# Patient Record
Sex: MALE | Race: WHITE | NOT HISPANIC OR LATINO | Employment: FULL TIME | URBAN - METROPOLITAN AREA
[De-identification: names, ages, dates, MRNs, and addresses within clinical notes are randomized per-mention and may not be internally consistent; named-entity substitution may affect disease eponyms.]

---

## 2017-02-02 ENCOUNTER — COMMUNICATION - HEALTHEAST (OUTPATIENT)
Dept: INTERNAL MEDICINE | Facility: CLINIC | Age: 47
End: 2017-02-02

## 2017-03-01 ENCOUNTER — COMMUNICATION - HEALTHEAST (OUTPATIENT)
Dept: INTERNAL MEDICINE | Facility: CLINIC | Age: 47
End: 2017-03-01

## 2017-03-20 ENCOUNTER — COMMUNICATION - HEALTHEAST (OUTPATIENT)
Dept: INTERNAL MEDICINE | Facility: CLINIC | Age: 47
End: 2017-03-20

## 2017-04-22 ENCOUNTER — COMMUNICATION - HEALTHEAST (OUTPATIENT)
Dept: INTERNAL MEDICINE | Facility: CLINIC | Age: 47
End: 2017-04-22

## 2017-05-20 ENCOUNTER — COMMUNICATION - HEALTHEAST (OUTPATIENT)
Dept: INTERNAL MEDICINE | Facility: CLINIC | Age: 47
End: 2017-05-20

## 2017-07-17 ENCOUNTER — COMMUNICATION - HEALTHEAST (OUTPATIENT)
Dept: INTERNAL MEDICINE | Facility: CLINIC | Age: 47
End: 2017-07-17

## 2017-08-16 ENCOUNTER — COMMUNICATION - HEALTHEAST (OUTPATIENT)
Dept: INTERNAL MEDICINE | Facility: CLINIC | Age: 47
End: 2017-08-16

## 2017-08-17 ENCOUNTER — RECORDS - HEALTHEAST (OUTPATIENT)
Dept: ADMINISTRATIVE | Facility: OTHER | Age: 47
End: 2017-08-17

## 2017-09-06 ENCOUNTER — COMMUNICATION - HEALTHEAST (OUTPATIENT)
Dept: INTERNAL MEDICINE | Facility: CLINIC | Age: 47
End: 2017-09-06

## 2017-10-09 ENCOUNTER — COMMUNICATION - HEALTHEAST (OUTPATIENT)
Dept: INTERNAL MEDICINE | Facility: CLINIC | Age: 47
End: 2017-10-09

## 2017-10-14 ENCOUNTER — COMMUNICATION - HEALTHEAST (OUTPATIENT)
Dept: INTERNAL MEDICINE | Facility: CLINIC | Age: 47
End: 2017-10-14

## 2017-11-09 ENCOUNTER — COMMUNICATION - HEALTHEAST (OUTPATIENT)
Dept: INTERNAL MEDICINE | Facility: CLINIC | Age: 47
End: 2017-11-09

## 2017-11-15 ENCOUNTER — COMMUNICATION - HEALTHEAST (OUTPATIENT)
Dept: INTERNAL MEDICINE | Facility: CLINIC | Age: 47
End: 2017-11-15

## 2017-12-08 ENCOUNTER — COMMUNICATION - HEALTHEAST (OUTPATIENT)
Dept: INTERNAL MEDICINE | Facility: CLINIC | Age: 47
End: 2017-12-08

## 2017-12-19 ENCOUNTER — COMMUNICATION - HEALTHEAST (OUTPATIENT)
Dept: INTERNAL MEDICINE | Facility: CLINIC | Age: 47
End: 2017-12-19

## 2017-12-27 ENCOUNTER — COMMUNICATION - HEALTHEAST (OUTPATIENT)
Dept: INTERNAL MEDICINE | Facility: CLINIC | Age: 47
End: 2017-12-27

## 2018-01-06 ENCOUNTER — COMMUNICATION - HEALTHEAST (OUTPATIENT)
Dept: INTERNAL MEDICINE | Facility: CLINIC | Age: 48
End: 2018-01-06

## 2018-01-19 ENCOUNTER — OFFICE VISIT - HEALTHEAST (OUTPATIENT)
Dept: INTERNAL MEDICINE | Facility: CLINIC | Age: 48
End: 2018-01-19

## 2018-01-19 DIAGNOSIS — I10 ESSENTIAL HYPERTENSION WITH GOAL BLOOD PRESSURE LESS THAN 140/90: ICD-10-CM

## 2018-01-19 DIAGNOSIS — Z79.899 MEDICATION MANAGEMENT: ICD-10-CM

## 2018-01-19 DIAGNOSIS — K21.9 GASTROESOPHAGEAL REFLUX DISEASE WITHOUT ESOPHAGITIS: ICD-10-CM

## 2018-01-19 LAB
ANION GAP SERPL CALCULATED.3IONS-SCNC: 12 MMOL/L (ref 5–18)
BASOPHILS # BLD AUTO: 0.1 THOU/UL (ref 0–0.2)
BASOPHILS NFR BLD AUTO: 1 % (ref 0–2)
BUN SERPL-MCNC: 7 MG/DL (ref 8–22)
CALCIUM SERPL-MCNC: 9.3 MG/DL (ref 8.5–10.5)
CHLORIDE BLD-SCNC: 97 MMOL/L (ref 98–107)
CO2 SERPL-SCNC: 30 MMOL/L (ref 22–31)
CREAT SERPL-MCNC: 0.75 MG/DL (ref 0.7–1.3)
EOSINOPHIL # BLD AUTO: 0.2 THOU/UL (ref 0–0.4)
EOSINOPHIL NFR BLD AUTO: 4 % (ref 0–6)
ERYTHROCYTE [DISTWIDTH] IN BLOOD BY AUTOMATED COUNT: 11.8 % (ref 11–14.5)
GFR SERPL CREATININE-BSD FRML MDRD: >60 ML/MIN/1.73M2
GLUCOSE BLD-MCNC: 107 MG/DL (ref 70–125)
HCT VFR BLD AUTO: 44.4 % (ref 40–54)
HGB BLD-MCNC: 15.8 G/DL (ref 14–18)
LYMPHOCYTES # BLD AUTO: 1.5 THOU/UL (ref 0.8–4.4)
LYMPHOCYTES NFR BLD AUTO: 25 % (ref 20–40)
MCH RBC QN AUTO: 38.1 PG (ref 27–34)
MCHC RBC AUTO-ENTMCNC: 35.6 G/DL (ref 32–36)
MCV RBC AUTO: 107 FL (ref 80–100)
MONOCYTES # BLD AUTO: 0.5 THOU/UL (ref 0–0.9)
MONOCYTES NFR BLD AUTO: 8 % (ref 2–10)
NEUTROPHILS # BLD AUTO: 3.7 THOU/UL (ref 2–7.7)
NEUTROPHILS NFR BLD AUTO: 63 % (ref 50–70)
PLATELET # BLD AUTO: 202 THOU/UL (ref 140–440)
PMV BLD AUTO: 10.7 FL (ref 8.5–12.5)
POTASSIUM BLD-SCNC: 3.3 MMOL/L (ref 3.5–5)
RBC # BLD AUTO: 4.15 MILL/UL (ref 4.4–6.2)
SODIUM SERPL-SCNC: 139 MMOL/L (ref 136–145)
WBC: 5.9 THOU/UL (ref 4–11)

## 2018-01-19 ASSESSMENT — MIFFLIN-ST. JEOR: SCORE: 1799.99

## 2018-01-22 ENCOUNTER — COMMUNICATION - HEALTHEAST (OUTPATIENT)
Dept: INTERNAL MEDICINE | Facility: CLINIC | Age: 48
End: 2018-01-22

## 2018-01-24 ENCOUNTER — COMMUNICATION - HEALTHEAST (OUTPATIENT)
Dept: INTERNAL MEDICINE | Facility: CLINIC | Age: 48
End: 2018-01-24

## 2018-01-24 DIAGNOSIS — I10 HYPERTENSION: ICD-10-CM

## 2018-03-01 ENCOUNTER — COMMUNICATION - HEALTHEAST (OUTPATIENT)
Dept: INTERNAL MEDICINE | Facility: CLINIC | Age: 48
End: 2018-03-01

## 2018-03-01 DIAGNOSIS — K21.9 GASTROESOPHAGEAL REFLUX DISEASE WITHOUT ESOPHAGITIS: ICD-10-CM

## 2018-04-03 ENCOUNTER — COMMUNICATION - HEALTHEAST (OUTPATIENT)
Dept: INTERNAL MEDICINE | Facility: CLINIC | Age: 48
End: 2018-04-03

## 2018-04-03 DIAGNOSIS — K21.9 GASTROESOPHAGEAL REFLUX DISEASE WITHOUT ESOPHAGITIS: ICD-10-CM

## 2018-05-01 ENCOUNTER — COMMUNICATION - HEALTHEAST (OUTPATIENT)
Dept: SCHEDULING | Facility: CLINIC | Age: 48
End: 2018-05-01

## 2018-05-01 ENCOUNTER — OFFICE VISIT - HEALTHEAST (OUTPATIENT)
Dept: INTERNAL MEDICINE | Facility: CLINIC | Age: 48
End: 2018-05-01

## 2018-05-01 DIAGNOSIS — L08.9 INFECTED WOUND: ICD-10-CM

## 2018-05-01 DIAGNOSIS — T14.8XXA INFECTED WOUND: ICD-10-CM

## 2018-05-01 ASSESSMENT — MIFFLIN-ST. JEOR: SCORE: 1796.77

## 2018-11-14 ENCOUNTER — RECORDS - HEALTHEAST (OUTPATIENT)
Dept: ADMINISTRATIVE | Facility: OTHER | Age: 48
End: 2018-11-14

## 2019-01-09 ENCOUNTER — COMMUNICATION - HEALTHEAST (OUTPATIENT)
Dept: INTERNAL MEDICINE | Facility: CLINIC | Age: 49
End: 2019-01-09

## 2019-01-09 DIAGNOSIS — I10 ESSENTIAL HYPERTENSION WITH GOAL BLOOD PRESSURE LESS THAN 140/90: ICD-10-CM

## 2019-02-08 ENCOUNTER — COMMUNICATION - HEALTHEAST (OUTPATIENT)
Dept: INTERNAL MEDICINE | Facility: CLINIC | Age: 49
End: 2019-02-08

## 2019-02-08 DIAGNOSIS — I10 HYPERTENSION: ICD-10-CM

## 2019-04-06 ENCOUNTER — COMMUNICATION - HEALTHEAST (OUTPATIENT)
Dept: INTERNAL MEDICINE | Facility: CLINIC | Age: 49
End: 2019-04-06

## 2019-04-06 DIAGNOSIS — I10 ESSENTIAL HYPERTENSION WITH GOAL BLOOD PRESSURE LESS THAN 140/90: ICD-10-CM

## 2019-04-08 ENCOUNTER — OFFICE VISIT - HEALTHEAST (OUTPATIENT)
Dept: INTERNAL MEDICINE | Facility: CLINIC | Age: 49
End: 2019-04-08

## 2019-04-08 DIAGNOSIS — I10 ESSENTIAL HYPERTENSION WITH GOAL BLOOD PRESSURE LESS THAN 140/90: ICD-10-CM

## 2019-04-08 LAB
ANION GAP SERPL CALCULATED.3IONS-SCNC: 14 MMOL/L (ref 5–18)
BUN SERPL-MCNC: 13 MG/DL (ref 8–22)
CALCIUM SERPL-MCNC: 10.1 MG/DL (ref 8.5–10.5)
CHLORIDE BLD-SCNC: 88 MMOL/L (ref 98–107)
CHOLEST SERPL-MCNC: 116 MG/DL
CO2 SERPL-SCNC: 25 MMOL/L (ref 22–31)
CREAT SERPL-MCNC: 1.2 MG/DL (ref 0.7–1.3)
FASTING STATUS PATIENT QL REPORTED: YES
GFR SERPL CREATININE-BSD FRML MDRD: >60 ML/MIN/1.73M2
GLUCOSE BLD-MCNC: 109 MG/DL (ref 70–125)
HDLC SERPL-MCNC: 46 MG/DL
POTASSIUM BLD-SCNC: 4 MMOL/L (ref 3.5–5)
SODIUM SERPL-SCNC: 127 MMOL/L (ref 136–145)

## 2019-04-08 ASSESSMENT — MIFFLIN-ST. JEOR: SCORE: 1769.37

## 2019-04-09 ENCOUNTER — COMMUNICATION - HEALTHEAST (OUTPATIENT)
Dept: INTERNAL MEDICINE | Facility: CLINIC | Age: 49
End: 2019-04-09

## 2019-04-09 ENCOUNTER — AMBULATORY - HEALTHEAST (OUTPATIENT)
Dept: INTERNAL MEDICINE | Facility: CLINIC | Age: 49
End: 2019-04-09

## 2019-04-09 DIAGNOSIS — I10 ESSENTIAL HYPERTENSION, BENIGN: ICD-10-CM

## 2019-05-03 ENCOUNTER — COMMUNICATION - HEALTHEAST (OUTPATIENT)
Dept: SCHEDULING | Facility: CLINIC | Age: 49
End: 2019-05-03

## 2019-05-03 ENCOUNTER — OFFICE VISIT - HEALTHEAST (OUTPATIENT)
Dept: INTERNAL MEDICINE | Facility: CLINIC | Age: 49
End: 2019-05-03

## 2019-05-03 DIAGNOSIS — I10 ESSENTIAL HYPERTENSION WITH GOAL BLOOD PRESSURE LESS THAN 140/90: ICD-10-CM

## 2019-05-03 DIAGNOSIS — R60.9 EDEMA, UNSPECIFIED TYPE: ICD-10-CM

## 2019-05-03 ASSESSMENT — MIFFLIN-ST. JEOR: SCORE: 1778.44

## 2019-05-04 ENCOUNTER — COMMUNICATION - HEALTHEAST (OUTPATIENT)
Dept: INTERNAL MEDICINE | Facility: CLINIC | Age: 49
End: 2019-05-04

## 2019-05-04 DIAGNOSIS — I10 HYPERTENSION: ICD-10-CM

## 2019-05-06 ENCOUNTER — COMMUNICATION - HEALTHEAST (OUTPATIENT)
Dept: INTERNAL MEDICINE | Facility: CLINIC | Age: 49
End: 2019-05-06

## 2019-05-06 ENCOUNTER — AMBULATORY - HEALTHEAST (OUTPATIENT)
Dept: INTERNAL MEDICINE | Facility: CLINIC | Age: 49
End: 2019-05-06

## 2019-05-06 DIAGNOSIS — R60.0 LOCALIZED EDEMA: ICD-10-CM

## 2019-06-26 ENCOUNTER — COMMUNICATION - HEALTHEAST (OUTPATIENT)
Dept: INTERNAL MEDICINE | Facility: CLINIC | Age: 49
End: 2019-06-26

## 2019-06-26 DIAGNOSIS — I10 ESSENTIAL HYPERTENSION WITH GOAL BLOOD PRESSURE LESS THAN 140/90: ICD-10-CM

## 2019-08-20 ENCOUNTER — COMMUNICATION - HEALTHEAST (OUTPATIENT)
Dept: INTERNAL MEDICINE | Facility: CLINIC | Age: 49
End: 2019-08-20

## 2019-08-20 DIAGNOSIS — I10 HYPERTENSION: ICD-10-CM

## 2019-09-08 ENCOUNTER — COMMUNICATION - HEALTHEAST (OUTPATIENT)
Dept: INTERNAL MEDICINE | Facility: CLINIC | Age: 49
End: 2019-09-08

## 2019-09-08 DIAGNOSIS — I10 ESSENTIAL HYPERTENSION WITH GOAL BLOOD PRESSURE LESS THAN 140/90: ICD-10-CM

## 2019-10-08 ENCOUNTER — AMBULATORY - HEALTHEAST (OUTPATIENT)
Dept: INTERNAL MEDICINE | Facility: CLINIC | Age: 49
End: 2019-10-08

## 2019-10-08 ENCOUNTER — COMMUNICATION - HEALTHEAST (OUTPATIENT)
Dept: INTERNAL MEDICINE | Facility: CLINIC | Age: 49
End: 2019-10-08

## 2019-10-08 DIAGNOSIS — N52.9 ERECTILE DYSFUNCTION, UNSPECIFIED ERECTILE DYSFUNCTION TYPE: ICD-10-CM

## 2019-12-04 ENCOUNTER — COMMUNICATION - HEALTHEAST (OUTPATIENT)
Dept: INTERNAL MEDICINE | Facility: CLINIC | Age: 49
End: 2019-12-04

## 2019-12-04 DIAGNOSIS — K21.9 GASTROESOPHAGEAL REFLUX DISEASE WITHOUT ESOPHAGITIS: ICD-10-CM

## 2020-01-20 ENCOUNTER — COMMUNICATION - HEALTHEAST (OUTPATIENT)
Dept: INTERNAL MEDICINE | Facility: CLINIC | Age: 50
End: 2020-01-20

## 2020-01-20 DIAGNOSIS — I10 ESSENTIAL HYPERTENSION WITH GOAL BLOOD PRESSURE LESS THAN 140/90: ICD-10-CM

## 2020-01-24 ENCOUNTER — COMMUNICATION - HEALTHEAST (OUTPATIENT)
Dept: INTERNAL MEDICINE | Facility: CLINIC | Age: 50
End: 2020-01-24

## 2020-01-24 DIAGNOSIS — N52.9 ERECTILE DYSFUNCTION, UNSPECIFIED ERECTILE DYSFUNCTION TYPE: ICD-10-CM

## 2020-04-22 ENCOUNTER — COMMUNICATION - HEALTHEAST (OUTPATIENT)
Dept: INTERNAL MEDICINE | Facility: CLINIC | Age: 50
End: 2020-04-22

## 2020-04-22 DIAGNOSIS — N52.9 ERECTILE DYSFUNCTION, UNSPECIFIED ERECTILE DYSFUNCTION TYPE: ICD-10-CM

## 2020-05-26 ENCOUNTER — COMMUNICATION - HEALTHEAST (OUTPATIENT)
Dept: INTERNAL MEDICINE | Facility: CLINIC | Age: 50
End: 2020-05-26

## 2020-05-26 DIAGNOSIS — K21.9 GASTROESOPHAGEAL REFLUX DISEASE WITHOUT ESOPHAGITIS: ICD-10-CM

## 2020-06-23 ENCOUNTER — COMMUNICATION - HEALTHEAST (OUTPATIENT)
Dept: INTERNAL MEDICINE | Facility: CLINIC | Age: 50
End: 2020-06-23

## 2020-06-23 DIAGNOSIS — K21.9 GASTROESOPHAGEAL REFLUX DISEASE WITHOUT ESOPHAGITIS: ICD-10-CM

## 2020-06-24 ENCOUNTER — COMMUNICATION - HEALTHEAST (OUTPATIENT)
Dept: INTERNAL MEDICINE | Facility: CLINIC | Age: 50
End: 2020-06-24

## 2020-06-24 DIAGNOSIS — N52.9 ERECTILE DYSFUNCTION, UNSPECIFIED ERECTILE DYSFUNCTION TYPE: ICD-10-CM

## 2020-07-16 ENCOUNTER — COMMUNICATION - HEALTHEAST (OUTPATIENT)
Dept: INTERNAL MEDICINE | Facility: CLINIC | Age: 50
End: 2020-07-16

## 2020-07-16 DIAGNOSIS — K21.9 GASTROESOPHAGEAL REFLUX DISEASE WITHOUT ESOPHAGITIS: ICD-10-CM

## 2020-07-17 ENCOUNTER — COMMUNICATION - HEALTHEAST (OUTPATIENT)
Dept: INTERNAL MEDICINE | Facility: CLINIC | Age: 50
End: 2020-07-17

## 2020-07-17 DIAGNOSIS — I10 ESSENTIAL HYPERTENSION WITH GOAL BLOOD PRESSURE LESS THAN 140/90: ICD-10-CM

## 2020-08-14 ENCOUNTER — COMMUNICATION - HEALTHEAST (OUTPATIENT)
Dept: INTERNAL MEDICINE | Facility: CLINIC | Age: 50
End: 2020-08-14

## 2020-08-14 DIAGNOSIS — I10 ESSENTIAL HYPERTENSION WITH GOAL BLOOD PRESSURE LESS THAN 140/90: ICD-10-CM

## 2020-10-06 ENCOUNTER — COMMUNICATION - HEALTHEAST (OUTPATIENT)
Dept: INTERNAL MEDICINE | Facility: CLINIC | Age: 50
End: 2020-10-06

## 2020-10-06 DIAGNOSIS — N52.9 ERECTILE DYSFUNCTION, UNSPECIFIED ERECTILE DYSFUNCTION TYPE: ICD-10-CM

## 2020-10-20 ENCOUNTER — COMMUNICATION - HEALTHEAST (OUTPATIENT)
Dept: INTERNAL MEDICINE | Facility: CLINIC | Age: 50
End: 2020-10-20

## 2020-10-20 DIAGNOSIS — I10 ESSENTIAL HYPERTENSION WITH GOAL BLOOD PRESSURE LESS THAN 140/90: ICD-10-CM

## 2020-11-10 ENCOUNTER — OFFICE VISIT - HEALTHEAST (OUTPATIENT)
Dept: INTERNAL MEDICINE | Facility: CLINIC | Age: 50
End: 2020-11-10

## 2020-11-10 DIAGNOSIS — Z11.59 ENCOUNTER FOR HEPATITIS C SCREENING TEST FOR LOW RISK PATIENT: ICD-10-CM

## 2020-11-10 DIAGNOSIS — N52.9 ERECTILE DYSFUNCTION, UNSPECIFIED ERECTILE DYSFUNCTION TYPE: ICD-10-CM

## 2020-11-10 DIAGNOSIS — E78.1 HYPERTRIGLYCERIDEMIA: ICD-10-CM

## 2020-11-10 DIAGNOSIS — I10 ESSENTIAL HYPERTENSION WITH GOAL BLOOD PRESSURE LESS THAN 140/90: ICD-10-CM

## 2020-11-10 DIAGNOSIS — K21.9 GASTROESOPHAGEAL REFLUX DISEASE WITHOUT ESOPHAGITIS: ICD-10-CM

## 2020-11-10 LAB
ALBUMIN SERPL-MCNC: 4.4 G/DL (ref 3.5–5)
ALP SERPL-CCNC: 126 U/L (ref 45–120)
ALT SERPL W P-5'-P-CCNC: 39 U/L (ref 0–45)
ANION GAP SERPL CALCULATED.3IONS-SCNC: 11 MMOL/L (ref 5–18)
AST SERPL W P-5'-P-CCNC: 57 U/L (ref 0–40)
BILIRUB SERPL-MCNC: 1.5 MG/DL (ref 0–1)
BUN SERPL-MCNC: 13 MG/DL (ref 8–22)
CALCIUM SERPL-MCNC: 9.9 MG/DL (ref 8.5–10.5)
CHLORIDE BLD-SCNC: 96 MMOL/L (ref 98–107)
CHOLEST SERPL-MCNC: 143 MG/DL
CO2 SERPL-SCNC: 29 MMOL/L (ref 22–31)
CREAT SERPL-MCNC: 1.21 MG/DL (ref 0.7–1.3)
ERYTHROCYTE [DISTWIDTH] IN BLOOD BY AUTOMATED COUNT: 10 % (ref 11–14.5)
FASTING STATUS PATIENT QL REPORTED: NO
GFR SERPL CREATININE-BSD FRML MDRD: >60 ML/MIN/1.73M2
GLUCOSE BLD-MCNC: 104 MG/DL (ref 70–125)
HCT VFR BLD AUTO: 49.6 % (ref 40–54)
HDLC SERPL-MCNC: 54 MG/DL
HGB BLD-MCNC: 17.2 G/DL (ref 14–18)
LDLC SERPL CALC-MCNC: 56 MG/DL
MCH RBC QN AUTO: 40 PG (ref 27–34)
MCHC RBC AUTO-ENTMCNC: 34.7 G/DL (ref 32–36)
MCV RBC AUTO: 115 FL (ref 80–100)
PLATELET # BLD AUTO: 119 THOU/UL (ref 140–440)
PMV BLD AUTO: 7.6 FL (ref 7–10)
POTASSIUM BLD-SCNC: 4.1 MMOL/L (ref 3.5–5)
PROT SERPL-MCNC: 7.3 G/DL (ref 6–8)
RBC # BLD AUTO: 4.3 MILL/UL (ref 4.4–6.2)
SODIUM SERPL-SCNC: 136 MMOL/L (ref 136–145)
TRIGL SERPL-MCNC: 164 MG/DL
WBC: 6.2 THOU/UL (ref 4–11)

## 2020-11-10 RX ORDER — METOPROLOL SUCCINATE 50 MG/1
TABLET, EXTENDED RELEASE ORAL
Qty: 50 TABLET | Refills: 3 | Status: SHIPPED | OUTPATIENT
Start: 2020-11-10 | End: 2021-09-30

## 2020-11-10 RX ORDER — LISINOPRIL AND HYDROCHLOROTHIAZIDE 20; 25 MG/1; MG/1
TABLET ORAL
Qty: 90 TABLET | Refills: 3 | Status: SHIPPED | OUTPATIENT
Start: 2020-11-10 | End: 2021-11-10

## 2020-11-10 RX ORDER — SILDENAFIL 50 MG/1
50 TABLET, FILM COATED ORAL PRN
Qty: 12 TABLET | Refills: 3 | Status: SHIPPED | OUTPATIENT
Start: 2020-11-10 | End: 2021-09-30

## 2020-11-10 ASSESSMENT — MIFFLIN-ST. JEOR: SCORE: 1728.55

## 2020-11-11 ENCOUNTER — COMMUNICATION - HEALTHEAST (OUTPATIENT)
Dept: INTERNAL MEDICINE | Facility: CLINIC | Age: 50
End: 2020-11-11

## 2020-11-11 LAB — HCV AB SERPL QL IA: NEGATIVE

## 2021-05-25 ENCOUNTER — RECORDS - HEALTHEAST (OUTPATIENT)
Dept: ADMINISTRATIVE | Facility: CLINIC | Age: 51
End: 2021-05-25

## 2021-05-27 NOTE — TELEPHONE ENCOUNTER
RN cannot approve Refill Request    RN can NOT refill this medication PCP messaged that patient is overdue for Labs.       Meron Isaac, [facility] Triage/Med Refill 4/8/2019    Requested Prescriptions   Pending Prescriptions Disp Refills     lisinopril-hydrochlorothiazide (PRINZIDE,ZESTORETIC) 20-25 mg per tablet [Pharmacy Med Name: LISINOPRIL-HCTZ 20-25 MG TAB] 90 tablet 0     Sig: TAKE ONE TABLET BY MOUTH ONE TIME DAILY       Diuretics/Combination Diuretics Refill Protocol  Failed - 4/6/2019 12:16 AM        Failed - Serum Potassium in past 12 months      No results found for: LN-POTASSIUM          Failed - Serum Sodium in past 12 months      No results found for: LN-SODIUM          Failed - Serum Creatinine in past 12 months      Creatinine   Date Value Ref Range Status   03/27/2018 0.76 0.70 - 1.30 mg/dL Final             Passed - Visit with PCP or prescribing provider visit in past 12 months     Last office visit with prescriber/PCP: 1/19/2018 Carlos Chow MD OR same dept: 5/1/2018 Raj Shen MD OR same specialty: 5/1/2018 Raj Shen MD  Last physical: 9/21/2016 Last MTM visit: Visit date not found   Next visit within 3 mo: Visit date not found  Next physical within 3 mo: Visit date not found  Prescriber OR PCP: Carlos Chow MD  Last diagnosis associated with med order: 1. Essential hypertension with goal blood pressure less than 140/90  - lisinopril-hydrochlorothiazide (PRINZIDE,ZESTORETIC) 20-25 mg per tablet [Pharmacy Med Name: LISINOPRIL-HCTZ 20-25 MG TAB]; TAKE ONE TABLET BY MOUTH ONE TIME DAILY  Dispense: 90 tablet; Refill: 0    If protocol passes may refill for 12 months if within 3 months of last provider visit (or a total of 15 months).             Passed - Blood pressure on file in past 12 months     BP Readings from Last 1 Encounters:   05/01/18 126/84

## 2021-05-27 NOTE — PROGRESS NOTES
"OFFICE VISIT NOTE    Subjective:   Chief Complaint:  Follow-up (med check)    49-year-old man in for follow-up regarding hypertension.  He is doing well.  He works full-time.  He is fairly active.  No cardiopulmonary symptoms.  He is having some minor problems with erectile dysfunction.    Current Outpatient Medications   Medication Sig     lisinopril-hydrochlorothiazide (PRINZIDE,ZESTORETIC) 20-25 mg per tablet TAKE ONE TABLET BY MOUTH ONE TIME DAILY     metoprolol succinate (TOPROL-XL) 50 MG 24 hr tablet Take 1 tablet (50 mg total) by mouth daily.     omeprazole (PRILOSEC) 20 MG capsule TAKE ONE CAPSULE BY MOUTH ONE TIME DAILY       PSFHx: Tobacco Status:  He  reports that he has never smoked. He has never used smokeless tobacco.    Review of Systems:  A comprehensive review of systems is negative except for the comments above    Objective:    Ht 5' 10\" (1.778 m)   Wt 198 lb (89.8 kg)   BMI 28.41 kg/m    GENERAL: No acute distress.  Pressure 112/70 pulse 70 and regular.  Eyes are normal without any hemorrhages or exudates.  Without bruits.  Lungs are clear.  Pedal pulses are excellent.  Heart shows a sinus rhythm without murmur gallop or rub.  No extra ectopic beats.  The abdomen is soft without masses or any organomegaly  No flank tenderness.    Assessment & Plan   Richard J Bloch Jr. is a 49 y.o. male.    Hypertension which is under very good control.  He is having some erectile dysfunction.  We will reduce the metoprolol to 25 mg daily.  Also reduce the lisinopril hydrochlorothiazide to 10-12.5 mg 1 tablet daily.  He should check blood pressures daily.  If he continues to have problems with erectile dysfunction, we will either discontinue metoprolol completely or add a medication such as Viagra.    Diagnoses and all orders for this visit:    Essential hypertension with goal blood pressure less than 140/90    Erectile dysfunction    The following high BMI interventions were performed this visit: encouragement " to exercise    Carlos Chow MD  Transcription using voice recognition software, may contain typographical errors.

## 2021-05-28 NOTE — TELEPHONE ENCOUNTER
Dr. Chow,   Please advise how you would like to proceed.  Would you like the patient to schedule an appointment?  Martina WEST, SHAMAR/NINO....................3:43 PM

## 2021-05-28 NOTE — TELEPHONE ENCOUNTER
RN cannot approve Refill Request    RN can NOT refill this medication SIG Clarification Needed, dose discrepancy .     Last office visit: 4/8/2019 Carlos Chow MD Last Physical: 9/21/2016 Last MTM visit: Visit date not found Last visit same specialty: 5/3/2019 Justin Rosales MD.  Next visit within 3 mo: Visit date not found  Next physical within 3 mo: Visit date not found      Queenie Owens, [facility] Triage/Med Refill 5/5/2019    Requested Prescriptions   Pending Prescriptions Disp Refills     metoprolol succinate (TOPROL-XL) 50 MG 24 hr tablet [Pharmacy Med Name: METOPROLOL SUCC ER 50 MG TAB] 90 tablet 0     Sig: TAKE 1 TABLET BY MOUTH EVERY DAY       Beta-Blockers Refill Protocol Passed - 5/4/2019 12:18 AM        Passed - PCP or prescribing provider visit in past 12 months or next 3 months     Last office visit with prescriber/PCP: 4/8/2019 Carlos Chow MD OR same dept: 5/3/2019 Justin Rosales MD OR same specialty: 5/3/2019 Justin Rosales MD  Last physical: 9/21/2016 Last MTM visit: Visit date not found   Next visit within 3 mo: Visit date not found  Next physical within 3 mo: Visit date not found  Prescriber OR PCP: Carlos Chow MD  Last diagnosis associated with med order: 1. Hypertension  - metoprolol succinate (TOPROL-XL) 50 MG 24 hr tablet [Pharmacy Med Name: METOPROLOL SUCC ER 50 MG TAB]; TAKE 1 TABLET BY MOUTH EVERY DAY  Dispense: 90 tablet; Refill: 0    If protocol passes may refill for 12 months if within 3 months of last provider visit (or a total of 15 months).             Passed - Blood pressure filed in past 12 months     BP Readings from Last 1 Encounters:   05/03/19 122/80

## 2021-05-28 NOTE — TELEPHONE ENCOUNTER
"BP meds were cut in half 2-3 weeks ago.    Has never been on a water pill or lasix before.    Legs hurt.  \"Fat', and work shoes also causing them to hurt.    Patient looking to be seen today DTN.    Transferred to scheduling for an appointment.    Dariela Escudero, RN, [facility] Nurse Triage/Med Refills RN     Reason for Disposition    MODERATE swelling of both ankles (e.g., swelling extends up to the knees) AND new onset or worsening    Protocols used: LEG SWELLING AND EDEMA-A-OH      "

## 2021-05-28 NOTE — TELEPHONE ENCOUNTER
Medication Question or Clarification  Who is calling: Patient  What medication are you calling about? (include dose and sig) Metoprolol, and HCTZ   Patient is still having the swelling in in his feet and it is worse than when he came in and saw Dr. Rosales   Who prescribed the medication?:  Claudine  What is your question/concern?:  Patient is having problems with feet swelling  Pharmacy:  [facility]  Okay to leave a detailed message?: Yes  Site CMT - Please call the pharmacy to obtain any additional needed information.

## 2021-05-28 NOTE — PROGRESS NOTES
[facility] Follow Up Note    Richard J Bloch .   49 y.o. male    Date of Visit: 5/3/2019    Chief Complaint   Patient presents with     Foot Swelling     BP meds got lowered, foot swelled after. Both feet.      Subjective  This is a 49-year-old man who is a patient of Dr. Manzanares.  He has a long history of hypertension.  He saw Dr. Chow about 1 month ago and I have had an opportunity to review those notes.  At that time his blood pressure medications were reduced.  His metoprolol dose was cut in half as was his dose of lisinopril-HCTZ.  Since that time he has developed increasing edema in his feet.  There does not seem to be any extension of the edema into the ankles or legs.  Again, the swelling is bilateral.  He denies any changes in his diet.  He is not taking any new over-the-counter medications.  He has not changed his activity level and is not spending any more time standing on his feet than he has in the past.  He denies any shortness of breath.  He has no other new symptoms to report.  The swelling is not necessarily getting progressively worse but it is not improving.    ROS A comprehensive review of systems was performed and was otherwise negative    Medications, allergies, and problem list were reviewed and updated    Exam  General Appearance:   On examination his blood pressure is good at 122/80.  Weight is 200 pounds which is up a couple of pounds since his last visit.  Height is 70 inches and BMI is 28.70.    Lungs are clear.    Heart is in a sinus rhythm with a rate of 84 and no ectopy.    He does have 1+ edema in both feet.  None in the ankles.    The patient is alert and oriented x3.      Assessment/Plan  1. Essential hypertension with goal blood pressure less than 140/90  lisinopril (PRINIVIL,ZESTRIL) 10 MG tablet    hydroCHLOROthiazide (HYDRODIURIL) 25 MG tablet   2. Edema, unspecified type       Hypertension.  Stable.    Increased edema in his feet.  In reviewing the  situation my only explanation is the decrease in his dose of hydrochlorothiazide last month.  I do not think that the change in his lisinopril or metoprolol doses is having any effect on this.  I discussed this with him and suggested we split his combination medication to continue the lisinopril 10 mg daily but to increase his HCTZ back to 25 mg daily as he has been on.  No change in the metoprolol.  I asked him to give this about 10 days to see how it works out and then follow-up with Dr. Chow.    Total time of this office visit was 25 minutes with greater than 50% of the time spent in care coordination of patient counseling.  Body Mass Index was not assessed due to Patient was in with an acute medical issue..    Justin Rosales MD      Current Outpatient Medications on File Prior to Visit   Medication Sig     lisinopril-hydrochlorothiazide (PRINZIDE,ZESTORETIC) 20-25 mg per tablet TAKE ONE TABLET BY MOUTH ONE TIME DAILY     lisinopril-hydrochlorothiazide (ZESTORETIC) 10-12.5 mg per tablet Take 1 tablet by mouth daily.     metoprolol succinate (TOPROL-XL) 50 MG 24 hr tablet Half tab p.o. daily     omeprazole (PRILOSEC) 20 MG capsule TAKE ONE CAPSULE BY MOUTH ONE TIME DAILY     [DISCONTINUED] lisinopril (PRINIVIL,ZESTRIL) 10 MG tablet Take 1 tablet (10 mg total) by mouth daily.     No current facility-administered medications on file prior to visit.      Allergies   Allergen Reactions     Amlodipine Swelling     Leg edema     Social History     Tobacco Use     Smoking status: Never Smoker     Smokeless tobacco: Never Used   Substance Use Topics     Alcohol use: Not on file     Drug use: Not on file

## 2021-05-28 NOTE — TELEPHONE ENCOUNTER
I  am okay with him trying the full dosage of the previous blood pressure medication.  If he has side effects or , edema persists, then he should try the once a week furosemide.

## 2021-05-28 NOTE — TELEPHONE ENCOUNTER
He should be restricting his sodium intake to 2 to 4 g of sodium per day.  On Wednesday of each week, he should take furosemide 20 mg.  That should help diurese him and get better with the extra fluid.  We may need to increase that dosage to 2 days/week but he should contact me first with a progress report.  I will send in the prescription for furosemide

## 2021-05-28 NOTE — TELEPHONE ENCOUNTER
Spoke with the patient and relayed message below from Dr. Chow.  Patient verbalized understanding and had no further questions at this time.  Martina WEST, SHAMAR/NINO....................5:08 PM

## 2021-05-28 NOTE — TELEPHONE ENCOUNTER
Patient states he was fine until you cut his BP med in half. I did give him the below instructions, but he is wondering your thoughts on going back to the BP full dose. Thank you.    Rafaela Malone, CMA

## 2021-05-30 NOTE — TELEPHONE ENCOUNTER
Refill Approved    Rx renewed per Medication Renewal Policy. Medication was last renewed on 4/8/19.    Meron Isaac, [facility] Triage/Med Refill 6/27/2019     Requested Prescriptions   Pending Prescriptions Disp Refills     lisinopril-hydrochlorothiazide (PRINZIDE,ZESTORETIC) 20-25 mg per tablet [Pharmacy Med Name: LISINOPRIL-HCTZ 20-25 MG TAB] 30 tablet 2     Sig: TAKE ONE TABLET BY MOUTH ONE TIME DAILY       Diuretics/Combination Diuretics Refill Protocol  Passed - 6/26/2019  5:22 PM        Passed - Visit with PCP or prescribing provider visit in past 12 months     Last office visit with prescriber/PCP: 4/8/2019 Carlos Chow MD OR same dept: 5/3/2019 Justin Rosales MD OR same specialty: 5/3/2019 Justin Rosales MD  Last physical: 9/21/2016 Last MTM visit: Visit date not found   Next visit within 3 mo: Visit date not found  Next physical within 3 mo: Visit date not found  Prescriber OR PCP: Carlos Chow MD  Last diagnosis associated with med order: 1. Essential hypertension with goal blood pressure less than 140/90  - lisinopril-hydrochlorothiazide (PRINZIDE,ZESTORETIC) 20-25 mg per tablet [Pharmacy Med Name: LISINOPRIL-HCTZ 20-25 MG TAB]; TAKE ONE TABLET BY MOUTH ONE TIME DAILY  Dispense: 30 tablet; Refill: 2    If protocol passes may refill for 12 months if within 3 months of last provider visit (or a total of 15 months).             Passed - Serum Potassium in past 12 months      Lab Results   Component Value Date    Potassium 4.0 04/08/2019             Passed - Serum Sodium in past 12 months      Lab Results   Component Value Date    Sodium 127 (L) 04/08/2019             Passed - Blood pressure on file in past 12 months     BP Readings from Last 1 Encounters:   05/03/19 122/80             Passed - Serum Creatinine in past 12 months      Creatinine   Date Value Ref Range Status   04/08/2019 1.20 0.70 - 1.30 mg/dL Final

## 2021-05-31 ENCOUNTER — RECORDS - HEALTHEAST (OUTPATIENT)
Dept: ADMINISTRATIVE | Facility: CLINIC | Age: 51
End: 2021-05-31

## 2021-05-31 VITALS — BODY MASS INDEX: 29.31 KG/M2 | WEIGHT: 204.75 LBS | HEIGHT: 70 IN

## 2021-05-31 NOTE — TELEPHONE ENCOUNTER
RN cannot approve Refill Request    RN can NOT refill this medication Protocol failed and NO refill given. Last office visit: 4/8/2019 Carlos Chow MD Last Physical: 9/21/2016 Last MTM visit: Visit date not found Last visit same specialty: 5/3/2019 Justin Rosales MD.  Next visit within 3 mo: Visit date not found  Next physical within 3 mo: Visit date not found      Skye Falk, [facility] Triage/Med Refill 8/20/2019    Requested Prescriptions   Pending Prescriptions Disp Refills     metoprolol succinate (TOPROL-XL) 50 MG 24 hr tablet [Pharmacy Med Name: METOPROLOL SUCC ER 50 MG TAB] 90 tablet 2     Sig: Take 1 tablet (50 mg total) by mouth daily.       Beta-Blockers Refill Protocol Passed - 8/20/2019  1:10 AM        Passed - PCP or prescribing provider visit in past 12 months or next 3 months     Last office visit with prescriber/PCP: 4/8/2019 Carlos Chow MD OR same dept: 5/3/2019 Justin Rosales MD OR same specialty: 5/3/2019 Justin Rosales MD  Last physical: 9/21/2016 Last MTM visit: Visit date not found   Next visit within 3 mo: Visit date not found  Next physical within 3 mo: Visit date not found  Prescriber OR PCP: Carlos Chow MD  Last diagnosis associated with med order: 1. Hypertension  - metoprolol succinate (TOPROL-XL) 50 MG 24 hr tablet [Pharmacy Med Name: METOPROLOL SUCC ER 50 MG TAB]; TAKE 1 TABLET BY MOUTH EVERY DAY  Dispense: 30 tablet; Refill: 2    If protocol passes may refill for 12 months if within 3 months of last provider visit (or a total of 15 months).             Passed - Blood pressure filed in past 12 months     BP Readings from Last 1 Encounters:   05/03/19 122/80

## 2021-06-01 VITALS — BODY MASS INDEX: 29.21 KG/M2 | WEIGHT: 204.04 LBS | HEIGHT: 70 IN

## 2021-06-01 NOTE — TELEPHONE ENCOUNTER
Refill Approved    Rx renewed per Medication Renewal Policy. Medication was last renewed on 5/3/19.    Tiffany Goodman, [facility] Triage/Med Refill 9/8/2019     Requested Prescriptions   Pending Prescriptions Disp Refills     hydroCHLOROthiazide (HYDRODIURIL) 25 MG tablet [Pharmacy Med Name: HYDROCHLOROTHIAZIDE 25 MG TAB] 30 tablet 11     Sig: TAKE 1 TABLET BY MOUTH EVERY DAY       Diuretics/Combination Diuretics Refill Protocol  Passed - 9/8/2019 11:23 AM        Passed - Visit with PCP or prescribing provider visit in past 12 months     Last office visit with prescriber/PCP: 5/3/2019 Justin Rosales MD OR same dept: 5/3/2019 Justin Rosales MD OR same specialty: 5/3/2019 Justin Rosales MD  Last physical: Visit date not found Last MTM visit: Visit date not found   Next visit within 3 mo: Visit date not found  Next physical within 3 mo: Visit date not found  Prescriber OR PCP: Justin Rosales MD  Last diagnosis associated with med order: 1. Essential hypertension with goal blood pressure less than 140/90  - hydroCHLOROthiazide (HYDRODIURIL) 25 MG tablet [Pharmacy Med Name: HYDROCHLOROTHIAZIDE 25 MG TAB]; TAKE 1 TABLET BY MOUTH EVERY DAY  Dispense: 30 tablet; Refill: 11    If protocol passes may refill for 12 months if within 3 months of last provider visit (or a total of 15 months).             Passed - Serum Potassium in past 12 months      Lab Results   Component Value Date    Potassium 4.0 04/08/2019             Passed - Serum Sodium in past 12 months      Lab Results   Component Value Date    Sodium 127 (L) 04/08/2019             Passed - Blood pressure on file in past 12 months     BP Readings from Last 1 Encounters:   05/03/19 122/80             Passed - Serum Creatinine in past 12 months      Creatinine   Date Value Ref Range Status   04/08/2019 1.20 0.70 - 1.30 mg/dL Final

## 2021-06-02 VITALS — HEIGHT: 70 IN | WEIGHT: 198 LBS | BODY MASS INDEX: 28.35 KG/M2

## 2021-06-02 NOTE — TELEPHONE ENCOUNTER
Medication Request  Medication name: Viagra  Pharmacy Name and Location: [facility]  Reason for request: Erectile dysfunction  When did you use medication last?:  n/a  Patient offered appointment:  patient declined  Okay to leave a detailed message: yes

## 2021-06-03 VITALS — HEIGHT: 70 IN | WEIGHT: 200 LBS | BODY MASS INDEX: 28.63 KG/M2

## 2021-06-04 NOTE — TELEPHONE ENCOUNTER
Future Appointments:  No future appointments. Last Appointment With Me:  11/6/2020     Requested Prescriptions     Pending Prescriptions Disp Refills    traMADoL (ULTRAM) 50 mg tablet 120 Tab 0     Sig: Take 1 Tab by mouth every six (6) hours as needed for Pain for up to 30 days. Max Daily Amount: 200 mg. Refill Request  Did you contact pharmacy: No  Medication name:   Requested Prescriptions     Pending Prescriptions Disp Refills     omeprazole (PRILOSEC) 20 MG capsule 30 capsule 8     Sig: Take 1 capsule (20 mg total) by mouth daily.     Who prescribed the medication: Carlos Chow MD   [facility] [place]   Is patient out of medication: n/a  Patient notified refills processed in 72 hours:  yes  Okay to leave a detailed message: yes

## 2021-06-04 NOTE — TELEPHONE ENCOUNTER
Refill Approved    Rx renewed per Medication Renewal Policy. Medication was last renewed on 4/4/18.    Meron Isaac, [facility] Triage/Med Refill 12/6/2019     Requested Prescriptions   Pending Prescriptions Disp Refills     omeprazole (PRILOSEC) 20 MG capsule 30 capsule 8     Sig: Take 1 capsule (20 mg total) by mouth daily.       GI Medications Refill Protocol Passed - 12/4/2019  2:42 PM        Passed - PCP or prescribing provider visit in last 12 or next 3 months.     Last office visit with prescriber/PCP: 4/8/2019 Carlos Chow MD OR same dept: 5/3/2019 Justin Rosales MD OR same specialty: 5/3/2019 Justin Rosales MD  Last physical: 9/21/2016 Last MTM visit: Visit date not found   Next visit within 3 mo: Visit date not found  Next physical within 3 mo: Visit date not found  Prescriber OR PCP: Carlos Chow MD  Last diagnosis associated with med order: 1. Gastroesophageal reflux disease without esophagitis  - omeprazole (PRILOSEC) 20 MG capsule; Take 1 capsule (20 mg total) by mouth daily.  Dispense: 30 capsule; Refill: 8    If protocol passes may refill for 12 months if within 3 months of last provider visit (or a total of 15 months).

## 2021-06-05 VITALS
HEIGHT: 70 IN | WEIGHT: 189 LBS | SYSTOLIC BLOOD PRESSURE: 126 MMHG | OXYGEN SATURATION: 98 % | HEART RATE: 73 BPM | DIASTOLIC BLOOD PRESSURE: 78 MMHG | BODY MASS INDEX: 27.06 KG/M2

## 2021-06-05 NOTE — TELEPHONE ENCOUNTER
Refill Approved    Rx renewed per Medication Renewal Policy. Medication was last renewed on 10/8/19 .    Tiffany Goodman, [facility] Connection Triage/Med Refill 1/24/2020     Requested Prescriptions   Pending Prescriptions Disp Refills     sildenafil (VIAGRA) 50 MG tablet [Pharmacy Med Name: SILDENAFIL 50 MG TABLET] 6 tablet 3     Sig: TAKE 1 TABLET BY MOUTH AS NEEDED FOR ERECTILE DYSFUNCTION       Medications for Impotence Refill Protocol Passed - 1/24/2020  1:41 AM        Passed - PCP or prescribing provider visit in last year     Last office visit with prescriber/PCP: 4/8/2019 Carlos Chow MD OR same dept: 5/3/2019 Justin Rosales MD OR same specialty: 5/3/2019 Justin Rosales MD  Last physical: 9/21/2016 Last MTM visit: Visit date not found   Next visit within 3 mo: Visit date not found  Next physical within 3 mo: Visit date not found  Prescriber OR PCP: Carlos Chow MD  Last diagnosis associated with med order: 1. Erectile dysfunction, unspecified erectile dysfunction type  - sildenafil (VIAGRA) 50 MG tablet [Pharmacy Med Name: SILDENAFIL 50 MG TABLET]; TAKE 1 TABLET BY MOUTH AS NEEDED FOR ERECTILE DYSFUNCTION  Dispense: 6 tablet; Refill: 3    If protocol passes may refill for 12 months if within 3 months of last provider visit (or a total of 15 months).

## 2021-06-05 NOTE — TELEPHONE ENCOUNTER
Refill Approved    Rx renewed per Medication Renewal Policy. Medication was last renewed on 6/27/2019 for 90/2  Last OV 5/3/2019    Keily Alcocer, [facility] Triage/Med Refill 1/21/2020     Requested Prescriptions   Pending Prescriptions Disp Refills     lisinopril-hydrochlorothiazide (PRINZIDE,ZESTORETIC) 20-25 mg per tablet [Pharmacy Med Name: LISINOPRIL-HCTZ 20-25 MG TAB] 30 tablet 8     Sig: TAKE 1 TABLET BY MOUTH EVERY DAY       Diuretics/Combination Diuretics Refill Protocol  Passed - 1/20/2020 10:35 AM        Passed - Visit with PCP or prescribing provider visit in past 12 months     Last office visit with prescriber/PCP: 4/8/2019 Carlos Chow MD OR same dept: 5/3/2019 Justin Rosales MD OR same specialty: 5/3/2019 Justin Rosales MD  Last physical: 9/21/2016 Last MTM visit: Visit date not found   Next visit within 3 mo: Visit date not found  Next physical within 3 mo: Visit date not found  Prescriber OR PCP: Carlos Chow MD  Last diagnosis associated with med order: 1. Essential hypertension with goal blood pressure less than 140/90  - lisinopril-hydrochlorothiazide (PRINZIDE,ZESTORETIC) 20-25 mg per tablet [Pharmacy Med Name: LISINOPRIL-HCTZ 20-25 MG TAB]; TAKE 1 TABLET BY MOUTH EVERY DAY  Dispense: 30 tablet; Refill: 8    If protocol passes may refill for 12 months if within 3 months of last provider visit (or a total of 15 months).             Passed - Serum Potassium in past 12 months      Lab Results   Component Value Date    Potassium 4.0 04/08/2019             Passed - Serum Sodium in past 12 months      Lab Results   Component Value Date    Sodium 127 (L) 04/08/2019             Passed - Blood pressure on file in past 12 months     BP Readings from Last 1 Encounters:   05/03/19 122/80             Passed - Serum Creatinine in past 12 months      Creatinine   Date Value Ref Range Status   04/08/2019 1.20 0.70 - 1.30 mg/dL Final

## 2021-06-07 NOTE — TELEPHONE ENCOUNTER
Refill Approved    Rx renewed per Medication Renewal Policy. Medication was last renewed on 1/24/20.    Eboni Swenson, [facility] Triage/Med Refill 4/22/2020     Requested Prescriptions   Pending Prescriptions Disp Refills     sildenafiL (VIAGRA) 50 MG tablet [Pharmacy Med Name: SILDENAFIL 50 MG TABLET] 6 tablet 2     Sig: TAKE 1 TABLET BY MOUTH AS NEEDED FOR ERECTILE DYSFUNCTION       Medications for Impotence Refill Protocol Passed - 4/22/2020 12:09 AM        Passed - PCP or prescribing provider visit in last year     Last office visit with prescriber/PCP: 4/8/2019 Carlos Chow MD OR same dept: 5/3/2019 Justin Rosales MD OR same specialty: 5/3/2019 Justin Rosales MD  Last physical: 9/21/2016 Last MTM visit: Visit date not found   Next visit within 3 mo: Visit date not found  Next physical within 3 mo: Visit date not found  Prescriber OR PCP: Carlos Chow MD  Last diagnosis associated with med order: 1. Erectile dysfunction, unspecified erectile dysfunction type  - sildenafiL (VIAGRA) 50 MG tablet [Pharmacy Med Name: SILDENAFIL 50 MG TABLET]; TAKE 1 TABLET BY MOUTH AS NEEDED FOR ERECTILE DYSFUNCTION  Dispense: 6 tablet; Refill: 2    If protocol passes may refill for 12 months if within 3 months of last provider visit (or a total of 15 months).

## 2021-06-08 NOTE — TELEPHONE ENCOUNTER
Rx does not pass refill protocol, will send to PCP to determine appropriateness to refill.     Harriett Barbour (Mariely), PharmD  Medication Therapy Management Pharmacist  [facility]

## 2021-06-09 NOTE — TELEPHONE ENCOUNTER
Refill request received from [facility] via fax for a refill of Metoprolol 50 mg. Order pended.    Last OV/VV on 5/3/19  Next scheduled appointment with PCP None

## 2021-06-09 NOTE — TELEPHONE ENCOUNTER
RN cannot approve Refill Request    RN can NOT refill this medication PCP messaged that patient is overdue for Office Visit. Last office visit: 4/8/2019 Carlos Chow MD Last Physical: 9/21/2016 Last MTM visit: Visit date not found Last visit same specialty: 5/3/2019 Justin Rosales MD.  Next visit within 3 mo: Visit date not found  Next physical within 3 mo: Visit date not found      Jade Del Cid, [facility] Triage/Med Refill 6/23/2020    Requested Prescriptions   Pending Prescriptions Disp Refills     omeprazole (PRILOSEC) 20 MG capsule [Pharmacy Med Name: OMEPRAZOLE DR 20 MG CAPSULE] 30 capsule 1     Sig: TAKE 1 CAPSULE BY MOUTH EVERY DAY (1 MON INS)       GI Medications Refill Protocol Failed - 6/23/2020  8:34 AM        Failed - PCP or prescribing provider visit in last 12 or next 3 months.     Last office visit with prescriber/PCP: 4/8/2019 Carlos Chow MD OR same dept: Visit date not found OR same specialty: 5/3/2019 Justin Rosales MD  Last physical: 9/21/2016 Last MTM visit: Visit date not found   Next visit within 3 mo: Visit date not found  Next physical within 3 mo: Visit date not found  Prescriber OR PCP: Carlos Chow MD  Last diagnosis associated with med order: 1. Gastroesophageal reflux disease without esophagitis  - omeprazole (PRILOSEC) 20 MG capsule [Pharmacy Med Name: OMEPRAZOLE DR 20 MG CAPSULE]; TAKE 1 CAPSULE BY MOUTH EVERY DAY (1 MON INS)  Dispense: 30 capsule; Refill: 1    If protocol passes may refill for 12 months if within 3 months of last provider visit (or a total of 15 months).

## 2021-06-09 NOTE — TELEPHONE ENCOUNTER
RN cannot approve Refill Request    RN can NOT refill this medication Protocol failed and NO refill given.        Meron Isaac, [facility] Triage/Med Refill 6/25/2020    Requested Prescriptions   Pending Prescriptions Disp Refills     sildenafiL (VIAGRA) 50 MG tablet [Pharmacy Med Name: SILDENAFIL 50 MG TABLET] 6 tablet 0     Sig: TAKE 1 TABLET BY MOUTH AS NEEDED FOR ERECTILE DYSFUNCTION       Medications for Impotence Refill Protocol Failed - 6/24/2020  4:32 PM        Failed - PCP or prescribing provider visit in last year     Last office visit with prescriber/PCP: 4/8/2019 Carlos Chow MD OR same dept: Visit date not found OR same specialty: 5/3/2019 Justin Rosales MD  Last physical: 9/21/2016 Last MTM visit: Visit date not found   Next visit within 3 mo: Visit date not found  Next physical within 3 mo: Visit date not found  Prescriber OR PCP: Carlos Chow MD  Last diagnosis associated with med order: 1. Erectile dysfunction, unspecified erectile dysfunction type  - sildenafiL (VIAGRA) 50 MG tablet [Pharmacy Med Name: SILDENAFIL 50 MG TABLET]; TAKE 1 TABLET BY MOUTH AS NEEDED FOR ERECTILE DYSFUNCTION  Dispense: 6 tablet; Refill: 0    If protocol passes may refill for 12 months if within 3 months of last provider visit (or a total of 15 months).

## 2021-06-09 NOTE — TELEPHONE ENCOUNTER
RN cannot approve Refill Request    RN can NOT refill this medication Protocol failed and NO refill given.      Meron Isaac, [facility] Triage/Med Refill 7/17/2020    Requested Prescriptions   Pending Prescriptions Disp Refills     omeprazole (PRILOSEC) 20 MG capsule [Pharmacy Med Name: OMEPRAZOLE DR 20 MG CAPSULE] 30 capsule 11     Sig: TAKE 1 CAPSULE BY MOUTH EVERY DAY (1 MON INS)       GI Medications Refill Protocol Failed - 7/16/2020  1:32 PM        Failed - PCP or prescribing provider visit in last 12 or next 3 months.     Last office visit with prescriber/PCP: 4/8/2019 Carlos Chow MD OR same dept: Visit date not found OR same specialty: 5/3/2019 Justin Rosales MD  Last physical: 9/21/2016 Last MTM visit: Visit date not found   Next visit within 3 mo: Visit date not found  Next physical within 3 mo: Visit date not found  Prescriber OR PCP: Carlos Chow MD  Last diagnosis associated with med order: 1. Gastroesophageal reflux disease without esophagitis  - omeprazole (PRILOSEC) 20 MG capsule [Pharmacy Med Name: OMEPRAZOLE DR 20 MG CAPSULE]; TAKE 1 CAPSULE BY MOUTH EVERY DAY (1 MON INS)  Dispense: 30 capsule; Refill: 1    If protocol passes may refill for 12 months if within 3 months of last provider visit (or a total of 15 months).

## 2021-06-10 NOTE — TELEPHONE ENCOUNTER
RN cannot approve Refill Request    RN can NOT refill this medication Protocol failed and NO refill given. Last office visit: 4/8/2019 Carlos Chow MD Last Physical: 9/21/2016 Last MTM visit: Visit date not found Last visit same specialty: 5/3/2019 Justin Rosales MD.  Next visit within 3 mo: Visit date not found  Next physical within 3 mo: Visit date not found      Meron Isaac, [facility] Triage/Med Refill 8/14/2020    Requested Prescriptions   Pending Prescriptions Disp Refills     lisinopriL-hydrochlorothiazide (PRINZIDE,ZESTORETIC) 20-25 mg per tablet [Pharmacy Med Name: LISINOPRIL-HCTZ 20-25 MG TAB] 30 tablet 6     Sig: TAKE 1 TABLET BY MOUTH EVERY DAY (1 MON INS)       Diuretics/Combination Diuretics Refill Protocol  Failed - 8/14/2020 12:09 AM        Failed - Visit with PCP or prescribing provider visit in past 12 months     Last office visit with prescriber/PCP: 4/8/2019 Carlos hCow MD OR same dept: Visit date not found OR same specialty: 5/3/2019 Justin Rosales MD  Last physical: 9/21/2016 Last MTM visit: Visit date not found   Next visit within 3 mo: Visit date not found  Next physical within 3 mo: Visit date not found  Prescriber OR PCP: Carlos Chow MD  Last diagnosis associated with med order: 1. Essential hypertension with goal blood pressure less than 140/90  - lisinopriL-hydrochlorothiazide (PRINZIDE,ZESTORETIC) 20-25 mg per tablet [Pharmacy Med Name: LISINOPRIL-HCTZ 20-25 MG TAB]; TAKE 1 TABLET BY MOUTH EVERY DAY (1 MON INS)  Dispense: 30 tablet; Refill: 6    If protocol passes may refill for 12 months if within 3 months of last provider visit (or a total of 15 months).             Failed - Serum Potassium in past 12 months      No results found for: LN-POTASSIUM          Failed - Serum Sodium in past 12 months      No results found for: LN-SODIUM          Failed - Blood pressure on file in past 12 months     BP Readings from Last 1 Encounters:   05/03/19 122/80              Failed - Serum Creatinine in past 12 months      Creatinine   Date Value Ref Range Status   04/08/2019 1.20 0.70 - 1.30 mg/dL Final

## 2021-06-12 NOTE — TELEPHONE ENCOUNTER
RN cannot approve Refill Request    RN can NOT refill this medication Protocol failed and NO refill given. Last office visit: 4/8/2019 Carlos Chow MD Last Physical: 9/21/2016 Last MTM visit: Visit date not found Last visit same specialty: 5/3/2019 Justin Rosales MD.  Next visit within 3 mo: Visit date not found  Next physical within 3 mo: Visit date not found      Meron Isaac, [facility] Triage/Med Refill 10/22/2020    Requested Prescriptions   Pending Prescriptions Disp Refills     metoprolol succinate (TOPROL-XL) 50 MG 24 hr tablet [Pharmacy Med Name: METOPROLOL SUCC ER 50 MG TAB] 15 tablet 2     Sig: TAKE 1/2 TABLET BY MOUTH ONCE DAILY (1 MON INS)       Beta-Blockers Refill Protocol Failed - 10/20/2020  3:42 PM        Failed - PCP or prescribing provider visit in past 12 months or next 3 months     Last office visit with prescriber/PCP: 4/8/2019 Carlos Chow MD OR same dept: Visit date not found OR same specialty: 5/3/2019 Justin Rosales MD  Last physical: 9/21/2016 Last MTM visit: Visit date not found   Next visit within 3 mo: Visit date not found  Next physical within 3 mo: Visit date not found  Prescriber OR PCP: Carlos Chow MD  Last diagnosis associated with med order: 1. Essential hypertension with goal blood pressure less than 140/90  - metoprolol succinate (TOPROL-XL) 50 MG 24 hr tablet [Pharmacy Med Name: METOPROLOL SUCC ER 50 MG TAB]; TAKE 1/2 TABLET BY MOUTH ONCE DAILY (1 MON INS)  Dispense: 15 tablet; Refill: 2    If protocol passes may refill for 12 months if within 3 months of last provider visit (or a total of 15 months).             Failed - Blood pressure filed in past 12 months     BP Readings from Last 1 Encounters:   05/03/19 122/80

## 2021-06-12 NOTE — TELEPHONE ENCOUNTER
RN cannot approve Refill Request    RN can NOT refill this medication med is not covered by policy/route to provider. Last office visit: Visit date not found Last Physical: Visit date not found Last MTM visit: Visit date not found Last visit same specialty: 5/3/2019 Justin Rosales MD.  Next visit within 3 mo: Visit date not found  Next physical within 3 mo: Visit date not found      Rosa Cordova, [facility] Triage/Med Refill 10/8/2020    Requested Prescriptions   Pending Prescriptions Disp Refills     sildenafiL (VIAGRA) 50 MG tablet [Pharmacy Med Name: SILDENAFIL 50 MG TABLET] 6 tablet 0     Sig: TAKE 1 TABLET BY MOUTH AS NEEDED FOR ERECTILE DYSFUNCTION       Medications for Impotence Refill Protocol Failed - 10/6/2020  8:19 AM        Failed - PCP or prescribing provider visit in last year     Last office visit with prescriber/PCP: Visit date not found OR same dept: Visit date not found OR same specialty: 5/3/2019 Justin Rosales MD  Last physical: Visit date not found Last MTM visit: Visit date not found   Next visit within 3 mo: Visit date not found  Next physical within 3 mo: Visit date not found  Prescriber OR PCP: Jennifer Smith MD  Last diagnosis associated with med order: 1. Erectile dysfunction, unspecified erectile dysfunction type  - sildenafiL (VIAGRA) 50 MG tablet [Pharmacy Med Name: SILDENAFIL 50 MG TABLET]; TAKE 1 TABLET BY MOUTH AS NEEDED FOR ERECTILE DYSFUNCTION  Dispense: 6 tablet; Refill: 0    If protocol passes may refill for 12 months if within 3 months of last provider visit (or a total of 15 months).

## 2021-06-13 NOTE — PROGRESS NOTES
"OFFICE VISIT NOTE    Subjective:   Chief Complaint:  Follow-up    50-year-old male in for follow-up regarding hypertension, hyperlipidemia.  He is doing well.  He works full-time.  He reports no cardiopulmonary symptoms.  He is rarely sick.  He has received his flu shot already.  He takes his medications daily.  He is active he feels well.    Current Outpatient Medications   Medication Sig     lisinopriL-hydrochlorothiazide (PRINZIDE,ZESTORETIC) 20-25 mg per tablet TAKE 1 TABLET BY MOUTH EVERY DAY (1 MON INS)     metoprolol succinate (TOPROL-XL) 50 MG 24 hr tablet TAKE 1/2 TABLET BY MOUTH ONCE DAILY (1 MON INS)     omeprazole (PRILOSEC) 20 MG capsule TAKE 1 CAPSULE BY MOUTH EVERY DAY (1 MON INS)     sildenafiL (VIAGRA) 50 MG tablet Take 1 tablet (50 mg total) by mouth as needed for erectile dysfunction.       PSFHx: Tobacco Status:  He  reports that he has never smoked. He has never used smokeless tobacco.    Review of Systems:  A comprehensive review of systems is negative except for the comments above    Objective:    Ht 5' 10\" (1.778 m)   Wt 189 lb (85.7 kg)   BMI 27.12 kg/m    GENERAL: No acute distress.  He looks to be in good health.  Today's blood pressure 126/78.  Pulse is 73.  Oxygen saturations 98% on room air.  No carotid bruits  Lungs are clear to percussion and auscultation  Heart shows a sinus rhythm without murmur gallop or rub  Pedal pulses are normal  Abdomen is soft and flat without masses or any organomegaly  Eyes are normal without any hypertensive changes.        Assessment & Plan   Richard J Bloch Jr. is a 50 y.o. male.    He is doing well.  His medicines will be continued.  I will check his labs today.    Diagnoses and all orders for this visit:    Essential hypertension with goal blood pressure less than 140/90  -     Comprehensive Metabolic Panel  -     metoprolol succinate (TOPROL-XL) 50 MG 24 hr tablet; TAKE 1/2 TABLET BY MOUTH ONCE DAILY (1 MON INS)  Dispense: 50 tablet; Refill: 3  -    "  lisinopriL-hydrochlorothiazide (PRINZIDE,ZESTORETIC) 20-25 mg per tablet; TAKE 1 TABLET BY MOUTH EVERY DAY (1 MON INS)  Dispense: 90 tablet; Refill: 3    Gastroesophageal reflux disease without esophagitis  -     HM2(CBC w/o Differential)  -     omeprazole (PRILOSEC) 20 MG capsule; TAKE 1 CAPSULE BY MOUTH EVERY DAY (1 MON INS)  Dispense: 30 capsule; Refill: 5    Erectile dysfunction, unspecified erectile dysfunction type  -     sildenafiL (VIAGRA) 50 MG tablet; Take 1 tablet (50 mg total) by mouth as needed for erectile dysfunction.  Dispense: 12 tablet; Refill: 3    Hypertriglyceridemia  -     Lipid Cascade    Encounter for hepatitis C screening test for low risk patient  -     Hepatitis C Antibody (Anti-HCV)            Carlos Chow MD  Transcription using voice recognition software, may contain typographical errors.

## 2021-06-15 NOTE — PROGRESS NOTES
"OFFICE VISIT NOTE    Subjective:   Chief Complaint:  Hypertension (needs meds refills)    47-year-old man in for follow-up regarding hypertension.  He takes both Prinzide and metoprolol.  He feels well.  He is active without any cardiopulmonary symptoms.  He takes his medications daily.  He is rarely sick.  He is a non-smoker.    Current Outpatient Prescriptions   Medication Sig     lisinopril-hydrochlorothiazide (PRINZIDE,ZESTORETIC) 20-25 mg per tablet TAKE ONE TABLET BY MOUTH ONE TIME DAILY     metoprolol succinate (TOPROL-XL) 50 MG 24 hr tablet TAKE ONE TABLET BY MOUTH ONE TIME DAILY     omeprazole (PRILOSEC) 20 MG capsule TAKE ONE CAPSULE BY MOUTH ONE TIME DAILY     metoprolol succinate (TOPROL-XL) 50 MG 24 hr tablet TAKE ONE TABLET BY MOUTH ONE TIME DAILY     omeprazole (PRILOSEC) 20 MG capsule TAKE ONE CAPSULE BY MOUTH ONE TIME DAILY       Review of Systems:  A comprehensive review of systems is negative except for the comments above    Objective:    /86 (Patient Site: Right Arm, Patient Position: Sitting, Cuff Size: Adult Regular)  Pulse 78  Ht 5' 10\" (1.778 m)  Wt 204 lb 12 oz (92.9 kg)  SpO2 95%  BMI 29.38 kg/m2  GENERAL: No acute distress.  Blood pressure checked in both arms is about 132/84.  Pulse 76 and regular.  Eyes are normal without any hemorrhages or exudates.  Heart shows a sinus rhythm without murmur gallop or rub.  Pedal pulses are excellent.  Lungs are clear.  Neurologic exam is normal.  The abdomen is soft without any organomegaly or masses.    Assessment & Plan   Richard J Bloch Jr. is a 47 y.o. male.    Patient is doing well.  Will continue on the same medications.  I did tell him he could back off the use of Prilosec.  Take it every other day for 2 weeks then take Zantac daily for 2 weeks then stop.  After that he can use Prilosec on a as needed basis.    Diagnoses and all orders for this visit:    Essential hypertension with goal blood pressure less than 140/90  -     Basic " Metabolic Panel    Gastroesophageal reflux disease without esophagitis    Medication management  -     HM1(CBC and Differential)  -     HM1 (CBC with Diff)    Other orders  -     lisinopril-hydrochlorothiazide (PRINZIDE,ZESTORETIC) 20-25 mg per tablet; TAKE ONE TABLET BY MOUTH ONE TIME DAILY  Dispense: 90 tablet; Refill: 3  -     metoprolol succinate (TOPROL-XL) 50 MG 24 hr tablet; TAKE ONE TABLET BY MOUTH ONE TIME DAILY  Dispense: 90 tablet; Refill: 3        Carlos Chow MD  Transcription using voice recognition software, may contain typographical errors.

## 2021-06-17 NOTE — PROGRESS NOTES
1. Infected wound  amoxicillin-clavulanate (AUGMENTIN) 875-125 mg per tablet    Td, Preservative Free (green label)      Plan: I did give him an antibiotic as this wound certainly does look infected.  He should take that as directed, and a prescribed Augmentin and told him the potential side effects from this medication.  He will let us know if he has any certain problems with the medication.  I did give him a tetanus update as well today.  He can use Steri-Strips across the wound for the next several days.  I did tell him that the aesthetics of the wound may not be that great since it was infected and he did not get it sutured right away.    Subjective: 48-year-old male who is here today with a forearm injury.  On 4/27, he was banging his fist against his window because his neighbor's dog was barking late at night, and the window gave way and a piece of the glass cut his forearm.  He just taped it up and wrapped it up even though it was bleeding quite a bit.  He did not get it looked at in number in an emergency room nor get it stitched up.  He had some Steri-Strips and that is what he used on it.  He does started noticing some yellow to green drainage today as well as more redness around the wound today.  He has had no fevers.  He has had no ascending redness up his arm.  His function of his hand is fine.    Objective: Well-appearing male in no acute distress.  Vital signs as noted.  He is afebrile.  The wound is nearly the length of his forearm from just proximal to the wrist crease to about 6 cm distal to the elbow crease.  The wound is holding together, but there is some areas of drainage peaking through the wound.  There is surrounding redness and warmth.  There is no lymphangitic spread up the arm.  He has good strength and sensation in his hand.

## 2021-06-19 NOTE — LETTER
Letter by Carlos Chow MD at      Author: Carlos Chow MD Service: -- Author Type: --    Filed:  Encounter Date: 4/9/2019 Status: (Other)         Richard J Bloch Jr.  [id number]  [facility] [place] [address]             April 9, 2019         Dear Mr. Bloch,    Below are the results from your recent visit:    Resulted Orders   Basic Metabolic Panel   Result Value Ref Range    Sodium 127 (L) 136 - 145 mmol/L    Potassium 4.0 3.5 - 5.0 mmol/L    Chloride 88 (L) 98 - 107 mmol/L    CO2 25 22 - 31 mmol/L    Anion Gap, Calculation 14 5 - 18 mmol/L    Glucose 109 70 - 125 mg/dL    Calcium 10.1 8.5 - 10.5 mg/dL    BUN 13 8 - 22 mg/dL    Creatinine 1.20 0.70 - 1.30 mg/dL    GFR MDRD Af Amer >60 >60 mL/min/1.73m2    GFR MDRD Non Af Amer >60 >60 mL/min/1.73m2    Narrative    Fasting Glucose reference range is 70-99 mg/dL per  American Diabetes Association (ADA) guidelines.   Cholesterol, Total   Result Value Ref Range    Cholesterol 116 <=199 mg/dL   HDL Cholesterol   Result Value Ref Range    HDL Cholesterol 46 >=40 mg/dL    Patient Fasting > 8hrs? Yes        Jose, recent labs are reviewed.  Your sodium was again low at 127.  Blood sugar and kidney functions were normal.  Total cholesterol was excellent at 116.    Because of the low sodium of 127, I am going to switch the medication to plain lisinopril, eliminating the hydrochlorothiazide which is a diuretic causing the drop in sodium.      Please call with questions or contact us using [employer].    Sincerely,        Electronically signed by Carlos Chow MD

## 2021-06-21 NOTE — LETTER
Letter by Carlos Chow MD at      Author: Carlos Chow MD Service: -- Author Type: --    Filed:  Encounter Date: 11/11/2020 Status: (Other)         Richard J Bloch Jr.  [id number]  [facility] [place] [address]             November 11, 2020         Dear Mr. Bloch,    Below are the results from your recent visit:    Resulted Orders   HM2(CBC w/o Differential)   Result Value Ref Range    WBC 6.2 4.0 - 11.0 thou/uL    RBC 4.30 (L) 4.40 - 6.20 mill/uL    Hemoglobin 17.2 14.0 - 18.0 g/dL    Hematocrit 49.6 40.0 - 54.0 %     (H) 80 - 100 fL    MCH 40.0 (H) 27.0 - 34.0 pg    MCHC 34.7 32.0 - 36.0 g/dL    RDW 10.0 (L) 11.0 - 14.5 %    Platelets 119 (L) 140 - 440 thou/uL    MPV 7.6 7.0 - 10.0 fL   Comprehensive Metabolic Panel   Result Value Ref Range    Sodium 136 136 - 145 mmol/L    Potassium 4.1 3.5 - 5.0 mmol/L    Chloride 96 (L) 98 - 107 mmol/L    CO2 29 22 - 31 mmol/L    Anion Gap, Calculation 11 5 - 18 mmol/L    Glucose 104 70 - 125 mg/dL    BUN 13 8 - 22 mg/dL    Creatinine 1.21 0.70 - 1.30 mg/dL    GFR MDRD Af Amer >60 >60 mL/min/1.73m2    GFR MDRD Non Af Amer >60 >60 mL/min/1.73m2    Bilirubin, Total 1.5 (H) 0.0 - 1.0 mg/dL    Calcium 9.9 8.5 - 10.5 mg/dL    Protein, Total 7.3 6.0 - 8.0 g/dL    Albumin 4.4 3.5 - 5.0 g/dL    Alkaline Phosphatase 126 (H) 45 - 120 U/L    AST 57 (H) 0 - 40 U/L    ALT 39 0 - 45 U/L    Narrative    Fasting Glucose reference range is 70-99 mg/dL per  American Diabetes Association (ADA) guidelines.   Lipid Cascade   Result Value Ref Range    Cholesterol 143 <=199 mg/dL    Triglycerides 164 (H) <=149 mg/dL    HDL Cholesterol 54 >=40 mg/dL    LDL Calculated 56 <=129 mg/dL    Patient Fasting > 8hrs? No    Hepatitis C Antibody (Anti-HCV)   Result Value Ref Range    Hepatitis C Ab Negative Negative       Jose, recent labs are reviewed.  Hemoglobin was okay at 17.2.  White blood count was normal.  Platelets, the blood cells responsible for blood clotting, are a little low  at 119,000.  You need to have these numbers rechecked again in about 6 weeks and see if they are changing.  Your electrolytes looked normal.  Creatinine, a measure of kidney function, was normal. ,   liver function test showed very slight abnormality in your AST, that also should be checked again in 6 weeks to get an idea which way it is going.  I did screen you for hepatitis C and that was negative, with no evidence of previous hepatitis C  infection.  Lipids looked okay.  LDL cholesterol, the bad variety, is excellent.    So, you need follow-up on the platelet count and liver function tests.  Definitely reduce or eliminate alcohol intake for the next 6 weeks to see if these liver tests improve.    Please call with questions or contact us using [employer].    Sincerely,        Electronically signed by Carlos Chow MD

## 2021-06-23 NOTE — TELEPHONE ENCOUNTER
Refill Approved    Rx renewed per Medication Renewal Policy. Medication was last renewed on 1/19/18.    Tammy Caceres, [facility] Triage/Med Refill 1/10/2019     Requested Prescriptions   Pending Prescriptions Disp Refills     lisinopril-hydrochlorothiazide (PRINZIDE,ZESTORETIC) 20-25 mg per tablet [Pharmacy Med Name: LISINOPRIL-HCTZ 20-25 MG TAB] 90 tablet 3     Sig: TAKE ONE TABLET BY MOUTH ONE TIME DAILY    Diuretics/Combination Diuretics Refill Protocol  Passed - 1/9/2019  1:41 AM       Passed - Visit with PCP or prescribing provider visit in past 12 months    Last office visit with prescriber/PCP: 1/19/2018 Carlos Chow MD OR same dept: 5/1/2018 Raj Shen MD OR same specialty: 5/1/2018 Raj Shen MD  Last physical: 9/21/2016 Last MTM visit: Visit date not found   Next visit within 3 mo: Visit date not found  Next physical within 3 mo: Visit date not found  Prescriber OR PCP: Carlos Chow MD  Last diagnosis associated with med order: There are no diagnoses linked to this encounter.  If protocol passes may refill for 12 months if within 3 months of last provider visit (or a total of 15 months).            Passed - Serum Potassium in past 12 months     Lab Results   Component Value Date    Potassium 3.2 (L) 03/27/2018            Passed - Serum Sodium in past 12 months     Lab Results   Component Value Date    Sodium 129 (L) 03/27/2018            Passed - Blood pressure on file in past 12 months    BP Readings from Last 1 Encounters:   05/01/18 126/84            Passed - Serum Creatinine in past 12 months     Creatinine   Date Value Ref Range Status   03/27/2018 0.76 0.70 - 1.30 mg/dL Final

## 2021-06-23 NOTE — TELEPHONE ENCOUNTER
Refill Approved    Rx renewed per Medication Renewal Policy. Medication was last renewed on 1/24/2018 with 1 refill.  Last office visit: 5/1/2018 with Dr JENNA Shen @ [facility] clinic.     Edyta Rosa, [facility] Triage/Med Refill 2/10/2019     Requested Prescriptions   Pending Prescriptions Disp Refills     metoprolol succinate (TOPROL-XL) 50 MG 24 hr tablet [Pharmacy Med Name: METOPROLOL SUCC ER 50 MG TAB] 90 tablet 0     Sig: TAKE ONE TABLET BY MOUTH ONE TIME DAILY    Beta-Blockers Refill Protocol Passed - 2/8/2019  1:28 AM       Passed - PCP or prescribing provider visit in past 12 months or next 3 months    Last office visit with prescriber/PCP: 1/19/2018 Carlos Chow MD OR same dept: 5/1/2018 Raj Shen MD OR same specialty: 5/1/2018 Raj Shen MD  Last physical: 9/21/2016 Last MTM visit: Visit date not found   Next visit within 3 mo: Visit date not found  Next physical within 3 mo: Visit date not found  Prescriber OR PCP: Carlos Chow MD  Last diagnosis associated with med order: 1. Hypertension  - metoprolol succinate (TOPROL-XL) 50 MG 24 hr tablet [Pharmacy Med Name: METOPROLOL SUCC ER 50 MG TAB]; TAKE ONE TABLET BY MOUTH ONE TIME DAILY  Dispense: 90 tablet; Refill: 0    If protocol passes may refill for 12 months if within 3 months of last provider visit (or a total of 15 months).            Passed - Blood pressure filed in past 12 months    BP Readings from Last 1 Encounters:   05/01/18 126/84

## 2021-08-01 ENCOUNTER — E-VISIT (OUTPATIENT)
Dept: URGENT CARE | Facility: CLINIC | Age: 51
End: 2021-08-01
Payer: COMMERCIAL

## 2021-08-01 DIAGNOSIS — N39.0 ACUTE UTI (URINARY TRACT INFECTION): Primary | ICD-10-CM

## 2021-08-01 PROCEDURE — 99421 OL DIG E/M SVC 5-10 MIN: CPT | Performed by: PHYSICIAN ASSISTANT

## 2021-08-01 RX ORDER — NITROFURANTOIN 25; 75 MG/1; MG/1
100 CAPSULE ORAL 2 TIMES DAILY
Qty: 10 CAPSULE | Refills: 0 | Status: SHIPPED | OUTPATIENT
Start: 2021-08-01 | End: 2021-08-06

## 2021-08-01 NOTE — PATIENT INSTRUCTIONS
Dear Richard J Bloch Jr.    After reviewing your responses, I've been able to diagnose you with a urinary tract infection, which is a common infection of the bladder with bacteria.  This is not a sexually transmitted infection, though urinating immediately after intercourse can help prevent infections.  Drinking lots of fluids is also helpful to clear your current infection and prevent the next one.      I have sent a prescription for antibiotics to your pharmacy to treat this infection.    It is important that you take all of your prescribed medication even if your symptoms are improving after a few doses.  Taking all of your medicine helps prevent the symptoms from returning.     If your symptoms worsen, you develop pain in your back or stomach, develop fevers, or are not improving in 5 days, please contact your primary care provider for an appointment or visit any of our convenient Walk-in or Urgent Care Centers to be seen, which can be found on our website here.    Thanks again for choosing us as your health care partner,    Alaina Cherry PA-C

## 2021-08-10 ENCOUNTER — E-VISIT (OUTPATIENT)
Dept: FAMILY MEDICINE | Facility: CLINIC | Age: 51
End: 2021-08-10
Payer: COMMERCIAL

## 2021-08-10 DIAGNOSIS — N39.0 ACUTE UTI (URINARY TRACT INFECTION): Primary | ICD-10-CM

## 2021-08-10 PROCEDURE — 99422 OL DIG E/M SVC 11-20 MIN: CPT | Performed by: PHYSICIAN ASSISTANT

## 2021-08-10 RX ORDER — SULFAMETHOXAZOLE/TRIMETHOPRIM 800-160 MG
1 TABLET ORAL 2 TIMES DAILY
Qty: 14 TABLET | Refills: 0 | Status: SHIPPED | OUTPATIENT
Start: 2021-08-10 | End: 2021-08-17

## 2021-08-10 NOTE — PATIENT INSTRUCTIONS
Dear Richard J Bloch Jr.    After reviewing your responses, I've been able to diagnose you with a urinary tract infection, which is a common infection of the bladder with bacteria.  This is not a sexually transmitted infection, though urinating immediately after intercourse can help prevent infections.  Drinking lots of fluids is also helpful to clear your current infection and prevent the next one.      I have sent a prescription for antibiotics to your pharmacy to treat this infection.    It is important that you take all of your prescribed medication even if your symptoms are improving after a few doses.  Taking all of your medicine helps prevent the symptoms from returning.     If your symptoms worsen, you develop pain in your back or stomach, develop fevers, or are not improving in 5 days, please contact your primary care provider for an appointment or visit any of our convenient Walk-in or Urgent Care Centers to be seen, which can be found on our website here.    Thanks again for choosing us as your health care partner,    Kelly Chan PA-C

## 2021-08-15 ENCOUNTER — HEALTH MAINTENANCE LETTER (OUTPATIENT)
Age: 51
End: 2021-08-15

## 2021-09-08 ENCOUNTER — TELEPHONE (OUTPATIENT)
Dept: URGENT CARE | Facility: URGENT CARE | Age: 51
End: 2021-09-08

## 2021-09-08 ENCOUNTER — OFFICE VISIT (OUTPATIENT)
Dept: URGENT CARE | Facility: URGENT CARE | Age: 51
End: 2021-09-08
Payer: COMMERCIAL

## 2021-09-08 VITALS
SYSTOLIC BLOOD PRESSURE: 92 MMHG | DIASTOLIC BLOOD PRESSURE: 67 MMHG | TEMPERATURE: 98 F | OXYGEN SATURATION: 98 % | HEART RATE: 78 BPM | RESPIRATION RATE: 20 BRPM

## 2021-09-08 DIAGNOSIS — R35.0 URINARY FREQUENCY: ICD-10-CM

## 2021-09-08 DIAGNOSIS — R80.9 PROTEINURIA, UNSPECIFIED TYPE: ICD-10-CM

## 2021-09-08 DIAGNOSIS — R82.2 BILIRUBINURIA: ICD-10-CM

## 2021-09-08 DIAGNOSIS — D69.6 THROMBOCYTOPENIA (H): ICD-10-CM

## 2021-09-08 DIAGNOSIS — R74.01 ELEVATED AST (SGOT): ICD-10-CM

## 2021-09-08 DIAGNOSIS — R79.89 ELEVATED SERUM CREATININE: ICD-10-CM

## 2021-09-08 DIAGNOSIS — R71.8 RBC ABNORMALITY: ICD-10-CM

## 2021-09-08 DIAGNOSIS — N39.0 ACUTE UTI: Primary | ICD-10-CM

## 2021-09-08 LAB
ALBUMIN SERPL-MCNC: 3.9 G/DL (ref 3.4–5)
ALBUMIN UR-MCNC: >=300 MG/DL
ALP SERPL-CCNC: 90 U/L (ref 40–150)
ALT SERPL W P-5'-P-CCNC: 38 U/L
ANION GAP SERPL CALCULATED.3IONS-SCNC: 3 MMOL/L (ref 3–14)
APPEARANCE UR: ABNORMAL
AST SERPL W P-5'-P-CCNC: 66 U/L (ref 0–45)
BACTERIA #/AREA URNS HPF: ABNORMAL /HPF
BASOPHILS # BLD AUTO: 0.1 10E3/UL (ref 0–0.2)
BASOPHILS NFR BLD AUTO: 1 %
BILIRUB SERPL-MCNC: 1.4 MG/DL (ref 0.2–1.3)
BILIRUB UR QL STRIP: ABNORMAL
BUN SERPL-MCNC: 13 MG/DL (ref 7–30)
CALCIUM SERPL-MCNC: 9.5 MG/DL (ref 8.5–10.1)
CHLORIDE BLD-SCNC: 99 MMOL/L (ref 94–109)
CO2 SERPL-SCNC: 33 MMOL/L (ref 20–32)
COLOR UR AUTO: YELLOW
CREAT SERPL-MCNC: 1.4 MG/DL (ref 0.66–1.25)
EOSINOPHIL # BLD AUTO: 0.2 10E3/UL (ref 0–0.7)
EOSINOPHIL NFR BLD AUTO: 3 %
ERYTHROCYTE [DISTWIDTH] IN BLOOD BY AUTOMATED COUNT: 13.1 % (ref 10–15)
GFR SERPL CREATININE-BSD FRML MDRD: 59 ML/MIN/1.73M2
GLUCOSE BLD-MCNC: 104 MG/DL (ref 70–99)
GLUCOSE UR STRIP-MCNC: NEGATIVE MG/DL
HCT VFR BLD AUTO: 39.7 % (ref 40–53)
HGB BLD-MCNC: 14.5 G/DL (ref 13.3–17.7)
HGB UR QL STRIP: ABNORMAL
KETONES UR STRIP-MCNC: 15 MG/DL
LEUKOCYTE ESTERASE UR QL STRIP: NEGATIVE
LYMPHOCYTES # BLD AUTO: 1.2 10E3/UL (ref 0.8–5.3)
LYMPHOCYTES NFR BLD AUTO: 19 %
MCH RBC QN AUTO: 40.3 PG (ref 26.5–33)
MCHC RBC AUTO-ENTMCNC: 36.5 G/DL (ref 31.5–36.5)
MCV RBC AUTO: 110 FL (ref 78–100)
MONOCYTES # BLD AUTO: 0.4 10E3/UL (ref 0–1.3)
MONOCYTES NFR BLD AUTO: 7 %
NEUTROPHILS # BLD AUTO: 4.3 10E3/UL (ref 1.6–8.3)
NEUTROPHILS NFR BLD AUTO: 70 %
NITRATE UR QL: POSITIVE
PH UR STRIP: 5.5 [PH] (ref 5–7)
PLATELET # BLD AUTO: 120 10E3/UL (ref 150–450)
POTASSIUM BLD-SCNC: 3.9 MMOL/L (ref 3.4–5.3)
PROT SERPL-MCNC: 7.3 G/DL (ref 6.8–8.8)
RBC # BLD AUTO: 3.6 10E6/UL (ref 4.4–5.9)
RBC #/AREA URNS AUTO: ABNORMAL /HPF
SODIUM SERPL-SCNC: 135 MMOL/L (ref 133–144)
SP GR UR STRIP: >=1.03 (ref 1–1.03)
SQUAMOUS #/AREA URNS AUTO: ABNORMAL /LPF
UROBILINOGEN UR STRIP-ACNC: 1 E.U./DL
WBC # BLD AUTO: 6.1 10E3/UL (ref 4–11)
WBC #/AREA URNS AUTO: >100 /HPF
WBC CLUMPS #/AREA URNS HPF: PRESENT /HPF

## 2021-09-08 PROCEDURE — 80053 COMPREHEN METABOLIC PANEL: CPT

## 2021-09-08 PROCEDURE — 81001 URINALYSIS AUTO W/SCOPE: CPT

## 2021-09-08 PROCEDURE — 36415 COLL VENOUS BLD VENIPUNCTURE: CPT

## 2021-09-08 PROCEDURE — 99214 OFFICE O/P EST MOD 30 MIN: CPT | Performed by: PHYSICIAN ASSISTANT

## 2021-09-08 PROCEDURE — 87086 URINE CULTURE/COLONY COUNT: CPT | Performed by: PHYSICIAN ASSISTANT

## 2021-09-08 PROCEDURE — 85025 COMPLETE CBC W/AUTO DIFF WBC: CPT

## 2021-09-08 RX ORDER — CIPROFLOXACIN 500 MG/1
500 TABLET, FILM COATED ORAL 2 TIMES DAILY
Qty: 14 TABLET | Refills: 0 | Status: SHIPPED | OUTPATIENT
Start: 2021-09-08 | End: 2021-09-15

## 2021-09-08 NOTE — PATIENT INSTRUCTIONS
1. Increase fluid intake  2. Complete antibiotic regimen as prescribed. You will be notified if the treatment plan needs to be changed based on the urine culture results.   3. For the discomfort: You may take an over the counter medication called pyridium (AZO) up three times daily for up to 2 days.  4. Follow up if you have a fever of 100.4 F (38 C) or higher, no improvement after three days of treatment, trouble urinating because of pain,new or increased discharge from the penis, rash or joint pain, Increased back or abdominal pain.    [facility]; if abnormal, we will call you.

## 2021-09-08 NOTE — PROGRESS NOTES
Assessment/Plan:    Urinalysis consistent with UTI; patient afebrile and no s/sx of pyelonephritis or acute prostatitis. Will prescribe Cipro as pt has had both nitrofurantoin & Bactrim recently, and this will cover for possible early kidney or prostate involvement.  Culture pending. Okay to f/u with urology in November as well, as it is unusual for a male of his age to have recurrent UTIs.  Pt had proteinuria & bilirubinuria, so CMP was done. Cr is mildly elevated/GFR very slightly decreased. AST & bilirubin also very minimally increased.   Pt has low BP but is asymptomatic with this. CBC was done to rule out leukocytosis that might indicate bacteremia/sepsis; no leukocytosis. Pt also has normal HR and is afebrile. CBC did demonstrate low platelet count, as well as low RBC count with elevated MCV but normal Hgb/Hct. These findings were present in Nov 2020 as well, no sign of this being rechecked or discussed by PCP.   I have advised pt have these labs rechecked by PCP when he establishes care with them, and he may need further evaluation if they remain abnormal.  See patient instructions below.    At the end of the encounter, I discussed results, diagnosis, medications. Discussed red flags for immediate return to clinic/ER, as well as indications for follow up if no improvement. Patient understood and agreed to plan. Patient was stable for discharge.      ICD-10-CM    1. Acute UTI  N39.0 ciprofloxacin (CIPRO) 500 MG tablet   2. Urinary frequency  R35.0 UA macro with reflex to Microscopic and Culture - Clinc Collect     Urine Microscopic Exam     Urine Culture     CBC with platelets and differential   3. Proteinuria, unspecified type  R80.9 Comprehensive metabolic panel (BMP + Alb, Alk Phos, ALT, AST, Total. Bili, TP)     CANCELED: Comprehensive metabolic panel (BMP + Alb, Alk Phos, ALT, AST, Total. Bili, TP)   4. Bilirubinuria  R82.2 Comprehensive metabolic panel (BMP + Alb, Alk Phos, ALT, AST, Total. Bili, TP)      CANCELED: Comprehensive metabolic panel (BMP + Alb, Alk Phos, ALT, AST, Total. Bili, TP)   5. Elevated AST (SGOT)  R74.01    6. Elevated serum creatinine  R79.89    7. Thrombocytopenia (H)  D69.6    8. RBC abnormality  R71.8          Return in about 3 days (around 9/11/2021) for Follow up w/ primary care provider if not better.    Natividad Sosa, MSPA, SHELLY  [facility]    -----------------------------------------------------------------------------------------------------------------------------------------------------------------------------------------------------------------------------------------  HPI:  Richard J Bloch . is a 51 year old male with history of HTN who presents for evaluation of dysuria & urinary frequency onset today. Pain is felt at the end of the penis when urinating. Pt had a virtual visit on 8/1/21, was diagnosed with UTI, and prescribed a 5 day course of nitrofurantoin. He took this as directed, but symptoms persisted. He had another virtual visit on 8/10/21, was diagnosed with UTI, and prescribed a 7 day course of Bactrim. He took this as directed, and symptoms completely resolved and he was in a normal state of health for about 3 weeks. He has a hx of one prior UTI about 9 years ago. Patient reports no blood in urine, penile discharge, pelvic/perineal/rectal pain, fever/chills, genital sores, abdominal pain, flank pain, nausea/vomiting, or any other symptoms.    He made an appt with urology for 11/12.     He is concerned about his low BP today; reports he checked it at work about 3 weeks ago and it was around 110/80. He does not feel SOB, lightheaded, or dizzy. He takes metoprolol and lisinopril-hydrochlorothiazide for his BP. He states his BP used to be very high, and he has lost about 50 lbs in the last year. His former PCP retired, and he is going to make an appt to establish care with a new PCP/get a routine physical soon.      Past Medical History:    Diagnosis Date     Vasquez's esophagus      Hypertriglyceridemia        Vitals:    09/08/21 1354   BP: 92/67   Pulse: 78   Resp: 20   Temp: 98  F (36.7  C)   TempSrc: Tympanic   SpO2: 98%       Physical Exam  Vitals and nursing note reviewed.   Constitutional:       Appearance: Normal appearance.   Pulmonary:      Effort: Pulmonary effort is normal.   Abdominal:      Palpations: Abdomen is soft.      Tenderness: There is no abdominal tenderness. There is no right CVA tenderness or left CVA tenderness.   Neurological:      Mental Status: He is alert.         Labs/Imaging:  Results for orders placed or performed in visit on 09/08/21 (from the past 24 hour(s))   UA macro with reflex to Microscopic and Culture - Clinc Collect    Specimen: Urine, Clean Catch   Result Value Ref Range    Color Urine Yellow Colorless, Straw, Light Yellow, Yellow    Appearance Urine Slightly Cloudy (A) Clear    Glucose Urine Negative Negative mg/dL    Bilirubin Urine Moderate (A) Negative    Ketones Urine 15  (A) Negative mg/dL    Specific Gravity Urine >=1.030 1.003 - 1.035    Blood Urine Large (A) Negative    pH Urine 5.5 5.0 - 7.0    Protein Albumin Urine >=300 (A) Negative mg/dL    Urobilinogen Urine 1.0 0.2, 1.0 E.U./dL    Nitrite Urine Positive (A) Negative    Leukocyte Esterase Urine Negative Negative   Urine Microscopic Exam   Result Value Ref Range    Bacteria Urine Many (A) None Seen /HPF    RBC Urine 2-5 (A) 0-2 /HPF /HPF    WBC Urine >100 (A) 0-5 /HPF /HPF    Squamous Epithelials Urine Few (A) None Seen /LPF    WBC Clumps Urine Present (A) None Seen /HPF         Patient Instructions   1. Increase fluid intake  2. Complete antibiotic regimen as prescribed. You will be notified if the treatment plan needs to be changed based on the urine culture results.   3. For the discomfort: You may take an over the counter medication called pyridium (AZO) up three times daily for up to 2 days.  4. Follow up if you have a fever of 100.4 F  (38 C) or higher, no improvement after three days of treatment, trouble urinating because of pain,new or increased discharge from the penis, rash or joint pain, Increased back or abdominal pain.    Normal labs will go into MyChart; if abnormal, we will call you.

## 2021-09-09 LAB — BACTERIA UR CULT: NORMAL

## 2021-09-09 NOTE — TELEPHONE ENCOUNTER
LMTCB. Called to give patient lab results. If calls back, please relay this message:    Liver enzyme AST mildly elevated.   Bilirubin mildly elevated.  Renal function slightly decreased, no acute renal failure.   Red blood cell count slightly decreased, but hemoglobin and hematocrit normal. May be anemia. Platelets also low. These were present last Nov so likely nothing new.     Don't think any of these findings are the cause of his current symptoms, but they should all be re-checked by his new primary care provider within a few weeks.    Natividad Sosa PA-C

## 2021-09-10 NOTE — TELEPHONE ENCOUNTER
Called and spoke to pt regarding to his lab results. Pt states he has set an appt with his new primary and will discuss with his new PCP about his lab results. He has no further questions or concerns.    MIGUEL ANGEL Pineda on 9/10/2021 at 10:35 AM

## 2021-09-30 ENCOUNTER — OFFICE VISIT (OUTPATIENT)
Dept: FAMILY MEDICINE | Facility: CLINIC | Age: 51
End: 2021-09-30
Payer: COMMERCIAL

## 2021-09-30 VITALS
OXYGEN SATURATION: 98 % | WEIGHT: 190 LBS | SYSTOLIC BLOOD PRESSURE: 96 MMHG | DIASTOLIC BLOOD PRESSURE: 68 MMHG | HEART RATE: 96 BPM | BODY MASS INDEX: 27.26 KG/M2

## 2021-09-30 DIAGNOSIS — N52.9 ERECTILE DYSFUNCTION, UNSPECIFIED ERECTILE DYSFUNCTION TYPE: ICD-10-CM

## 2021-09-30 DIAGNOSIS — R30.0 DYSURIA: ICD-10-CM

## 2021-09-30 DIAGNOSIS — I10 ESSENTIAL HYPERTENSION WITH GOAL BLOOD PRESSURE LESS THAN 140/90: Primary | ICD-10-CM

## 2021-09-30 LAB
ALBUMIN UR-MCNC: 30 MG/DL
APPEARANCE UR: CLEAR
BACTERIA #/AREA URNS HPF: ABNORMAL /HPF
BILIRUB UR QL STRIP: ABNORMAL
COLOR UR AUTO: YELLOW
GLUCOSE UR STRIP-MCNC: NEGATIVE MG/DL
HGB UR QL STRIP: ABNORMAL
KETONES UR STRIP-MCNC: ABNORMAL MG/DL
LEUKOCYTE ESTERASE UR QL STRIP: ABNORMAL
NITRATE UR QL: NEGATIVE
PH UR STRIP: 5.5 [PH] (ref 5–8)
RBC #/AREA URNS AUTO: ABNORMAL /HPF
SP GR UR STRIP: 1.02 (ref 1–1.03)
SQUAMOUS #/AREA URNS AUTO: ABNORMAL /LPF
UROBILINOGEN UR STRIP-ACNC: 0.2 E.U./DL
WBC #/AREA URNS AUTO: ABNORMAL /HPF

## 2021-09-30 PROCEDURE — 81001 URINALYSIS AUTO W/SCOPE: CPT | Performed by: FAMILY MEDICINE

## 2021-09-30 PROCEDURE — 99215 OFFICE O/P EST HI 40 MIN: CPT | Performed by: FAMILY MEDICINE

## 2021-09-30 RX ORDER — SILDENAFIL CITRATE 20 MG/1
TABLET ORAL
Qty: 90 TABLET | Refills: 3 | Status: SHIPPED | OUTPATIENT
Start: 2021-09-30 | End: 2023-02-28

## 2021-10-03 NOTE — PROGRESS NOTES
"    Assessment & Plan     Essential hypertension with goal blood pressure less than 140/90    His blood pressure is quite good today and in fact a little on the low side so I suggested that he stop the metoprolol and just use the lisinopril hydrochlorothiazide for his blood pressure.  He is very comfortable with this suggestion.  Also with his next problem which is a bit of erectile dysfunction, the metoprolol might actually be contributing to that so that might also be beneficial to stop that for that reason.  He will continue to monitor his blood pressures at home and then he will come back and see us in a few weeks to make sure that he is improving      Erectile dysfunction, unspecified erectile dysfunction type    We did prescribe him the sildenafil with a good Rx card with instructions on its usage and how to use the GoodRx card for his most benefit.  We talked about the medication and the side effects and potential interactions as well.  He will let us know if he is having any problems with it.      - sildenafil (REVATIO) 20 MG tablet; Take 3-4 tabs as directed before sexual activity    Dysuria    His urine showed no sign of infection today.  I recommended that he continue with good hydration and following up if he is not improving.  - UA Macro with Reflex to Micro and Culture - lab collect; Future  - UA Macro with Reflex to Micro and Culture - lab collect  - Urine Microscopic    Review of external notes as documented elsewhere in note  Review of the result(s) of each unique test - UA  Ordering of each unique test  Prescription drug management  45 minutes spent on the date of the encounter doing chart review, review of outside records, review of test results, interpretation of tests, patient visit and documentation        BMI:   Estimated body mass index is 27.26 kg/m  as calculated from the following:    Height as of 11/10/20: 1.778 m (5' 10\").    Weight as of this encounter: 86.2 kg (190 lb).           No " follow-ups on file.    Raj Shen MD  [facility] KADE Garcia is a 51 year old who presents for the following health issues     HPI     Patient is a new patient to our clinic was establishing care with us today.  He does have a couple of issues to bring up.  He has had a diagnosis of essential hypertension.  He has been on lisinopril hydrochlorothiazide at a pretty good dose of 20/25, as well as metoprolol at 50 mg a day.  He is wondering if he needs all these medications.  His blood pressures have been trending pretty low recently.  He has been trying to exercise and lose a little bit of weight and has been successful at that.  He denies any significant lightheadedness or dizziness.    He does also bring up some erectile dysfunction that he has been having recently.  He is not sure if he would attributed or worsening with the metoprolol but it is an issue that he has had with some trouble initiating erections as well as maintaining them through intercourse.  His libido is good.    Also would like to recheck a urine because he was in recently and treated for what presumed bladder infection with some dysuria of urination but he states that he is better but would just like to have it rechecked.      Review of Systems   Constitutional, HEENT, cardiovascular, pulmonary, gi and gu systems are negative, except as otherwise noted.      Objective    BP 96/68 (BP Location: Left arm, Patient Position: Sitting, Cuff Size: Adult Large)   Pulse 96   Wt 86.2 kg (190 lb)   SpO2 98%   BMI 27.26 kg/m    Body mass index is 27.26 kg/m .  Physical Exam   GENERAL: healthy, alert and no distress  NECK: no adenopathy, no asymmetry, masses, or scars and thyroid normal to palpation  RESP: lungs clear to auscultation - no rales, rhonchi or wheezes  CV: regular rate and rhythm, normal S1 S2, no S3 or S4, no murmur, click or rub, no peripheral edema and peripheral pulses strong  ABDOMEN: soft,  nontender, no hepatosplenomegaly, no masses and bowel sounds normal  MS: no gross musculoskeletal defects noted, no edema    Results for orders placed or performed in visit on 09/30/21   UA Macro with Reflex to Micro and Culture - lab collect     Status: Abnormal    Specimen: Urine, Midstream   Result Value Ref Range    Color Urine Yellow Colorless, Straw, Light Yellow, Yellow    Appearance Urine Clear Clear    Glucose Urine Negative Negative mg/dL    Bilirubin Urine Small (A) Negative    Ketones Urine Trace (A) Negative mg/dL    Specific Gravity Urine 1.020 1.005 - 1.030    Blood Urine Trace (A) Negative    pH Urine 5.5 5.0 - 8.0    Protein Albumin Urine 30  (A) Negative mg/dL    Urobilinogen Urine 0.2 0.2, 1.0 E.U./dL    Nitrite Urine Negative Negative    Leukocyte Esterase Urine Trace (A) Negative   Urine Microscopic     Status: Abnormal   Result Value Ref Range    Bacteria Urine Few (A) None Seen /HPF    RBC Urine 0-2 0-2 /HPF /HPF    WBC Urine 0-5 0-5 /HPF /HPF    Squamous Epithelials Urine Few (A) None Seen /LPF    Narrative    Urine Culture not indicated

## 2021-11-09 DIAGNOSIS — I10 ESSENTIAL HYPERTENSION WITH GOAL BLOOD PRESSURE LESS THAN 140/90: ICD-10-CM

## 2021-11-10 RX ORDER — LISINOPRIL AND HYDROCHLOROTHIAZIDE 20; 25 MG/1; MG/1
TABLET ORAL
Qty: 30 TABLET | Refills: 11 | Status: SHIPPED | OUTPATIENT
Start: 2021-11-10 | End: 2022-11-08

## 2021-11-10 NOTE — TELEPHONE ENCOUNTER
"Routing refill request to provider for review/approval because:  Labs out of range:  CR    Last Written Prescription Date:  11/10/2020  Last Fill Quantity: 90,  # refills: 3   Last office visit provider:  9/30/21     Requested Prescriptions   Pending Prescriptions Disp Refills     lisinopril-hydrochlorothiazide (ZESTORETIC) 20-25 MG tablet [Pharmacy Med Name: LISINOPRIL-HCTZ 20-25 MG TAB] 30 tablet 11     Sig: TAKE 1 TABLET BY MOUTH EVERY DAY (1 MON INS)       Diuretics (Including Combos) Protocol Failed - 11/9/2021 12:28 AM        Failed - Normal serum creatinine on file in past 12 months     Recent Labs   Lab Test 09/08/21  1455   CR 1.40*              Passed - Blood pressure under 140/90 in past 12 months     BP Readings from Last 3 Encounters:   09/30/21 96/68   09/08/21 92/67   11/10/20 126/78                 Passed - Recent (12 mo) or future (30 days) visit within the authorizing provider's specialty     Patient has had an office visit with the authorizing provider or a provider within the authorizing providers department within the previous 12 mos or has a future within next 30 days. See \"Patient Info\" tab in inbasket, or \"Choose Columns\" in Meds & Orders section of the refill encounter.              Passed - Medication is active on med list        Passed - Patient is age 18 or older        Passed - Normal serum potassium on file in past 12 months     Recent Labs   Lab Test 09/08/21  1455   POTASSIUM 3.9                    Passed - Normal serum sodium on file in past 12 months     Recent Labs   Lab Test 09/08/21  1455                ACE Inhibitors (Including Combos) Protocol Failed - 11/9/2021 12:28 AM        Failed - Normal serum creatinine on file in past 12 months     Recent Labs   Lab Test 09/08/21  1455   CR 1.40*       Ok to refill medication if creatinine is low          Passed - Blood pressure under 140/90 in past 12 months     BP Readings from Last 3 Encounters:   09/30/21 96/68   09/08/21 " "92/67   11/10/20 126/78                 Passed - Recent (12 mo) or future (30 days) visit within the authorizing provider's specialty     Patient has had an office visit with the authorizing provider or a provider within the authorizing providers department within the previous 12 mos or has a future within next 30 days. See \"Patient Info\" tab in inbasket, or \"Choose Columns\" in Meds & Orders section of the refill encounter.              Passed - Medication is active on med list        Passed - Patient is age 18 or older        Passed - Normal serum potassium on file in past 12 months     Recent Labs   Lab Test 09/08/21  1455   POTASSIUM 3.9                  Stefanie Corona RN 11/10/21 11:19 AM  "

## 2021-12-16 DIAGNOSIS — I10 ESSENTIAL HYPERTENSION WITH GOAL BLOOD PRESSURE LESS THAN 140/90: Primary | ICD-10-CM

## 2021-12-16 RX ORDER — METOPROLOL SUCCINATE 50 MG/1
TABLET, EXTENDED RELEASE ORAL
COMMUNITY
Start: 2021-11-08 | End: 2021-12-16

## 2021-12-16 NOTE — TELEPHONE ENCOUNTER
Last seen by Ac 9/30/21.  Script request from [facility] on the Metoprolol 50 mg tab.  Take 1/2 tablet by mouth daily.  Please advise on refill.     Essential hypertension with goal blood pressure less than 140/90     His blood pressure is quite good today and in fact a little on the low side so I suggested that he stop the metoprolol and just use the lisinopril hydrochlorothiazide for his blood pressure.  He is very comfortable with this suggestion.  Also with his next problem which is a bit of erectile dysfunction, the metoprolol might actually be contributing to that so that might also be beneficial to stop that for that reason.  He will continue to monitor his blood pressures at home and then he will come back and see us in a few weeks to make sure that he is improving     DEONDRE RODRIGUEZ on 12/16/2021 at 4:31 PM

## 2021-12-17 RX ORDER — METOPROLOL SUCCINATE 50 MG/1
50 TABLET, EXTENDED RELEASE ORAL DAILY
Qty: 90 TABLET | Refills: 3 | Status: SHIPPED | OUTPATIENT
Start: 2021-12-17 | End: 2022-12-14

## 2022-01-18 ENCOUNTER — OFFICE VISIT (OUTPATIENT)
Dept: UROLOGY | Facility: CLINIC | Age: 52
End: 2022-01-18
Payer: COMMERCIAL

## 2022-01-18 DIAGNOSIS — Z87.440 PERSONAL HISTORY OF URINARY TRACT INFECTION: Primary | ICD-10-CM

## 2022-01-18 PROCEDURE — 99203 OFFICE O/P NEW LOW 30 MIN: CPT | Performed by: UROLOGY

## 2022-01-18 NOTE — PROGRESS NOTES
Urology Consult History and Physical  HATTIE VALDEZ   Name: Richard J Bloch Jr.    MRN: [id number]   YOB: 1970       We were asked to see Richard J Bloch Jr. at the request of SELF for evaluation and treatment of recent UTI.        Chief Complaint:   Recent UTI    History is obtained from the patient            History of Present Illness:   Richard J Bloch Jr. is a 51 year old male who is being seen for evaluation of Recent UTI    Had a UTI in September - though UCx negative   This required a few rounds of abx for symptoms to resolve  He has been doing well since that time  No LUTS   No gross hematuria, acute urinary retention    One other UTI in his 30s    No family history of prostate cancer or prostate issues           Past Medical History:     Past Medical History:   Diagnosis Date     Vasquez's esophagus      Hypertriglyceridemia             Past Surgical History:     Past Surgical History:   Procedure Laterality Date     TONSILLECTOMY & ADENOIDECTOMY              Social History:     Social History     Tobacco Use     Smoking status: Never Smoker     Smokeless tobacco: Never Used   Substance Use Topics     Alcohol use: Not on file       History   Smoking Status     Never Smoker   Smokeless Tobacco     Never Used            Family History:   No family history on file.           Allergies:     Allergies   Allergen Reactions     Amlodipine Swelling     Leg edema            Medications:     Current Outpatient Medications   Medication Sig     lisinopril-hydrochlorothiazide (ZESTORETIC) 20-25 MG tablet TAKE 1 TABLET BY MOUTH EVERY DAY (1 MON INS)     metoprolol succinate ER (TOPROL-XL) 50 MG 24 hr tablet Take 1 tablet (50 mg) by mouth daily     omeprazole (PRILOSEC) 20 MG DR capsule TAKE 1 CAPSULE BY MOUTH EVERY DAY (1 MON INS)     sildenafil (REVATIO) 20 MG tablet Take 3-4 tabs as directed before sexual activity     No current facility-administered medications for this visit.             Review of  Systems:     Skin: negative  Eyes: negative  Ears/Nose/Throat: negative  Respiratory: No shortness of breath, dyspnea on exertion, cough, or hemoptysis  Cardiovascular: negative  Gastrointestinal: negative  Genitourinary: as above  Musculoskeletal: negative  Neurologic: negative  Psychiatric: negative  Hematologic/Lymphatic/Immunologic: negative  Endocrine: negative          Physical Exam:   No data found.  There is no height or weight on file to calculate BMI.     General: age-appropriate appearing male in NAD  HEENT: Head AT/NC, EOMI, CN Grossly intact  Lungs: no respiratory distress, or pursed lip breathing  Heart: No obvious jugular venous distension present  Back: no bony midline tenderness, no CVAT bilaterally.  Abdomen: soft, non-distended, non-tender. No organomegaly  Lymph: no palpable inguinal lymphadenopathy.  LE: no edema.   Musculoskeltal: extremities normal, no peripheral edema  Skin: no suspicious lesions or rashes  Neuro: Alert, oriented, speech and mentation normal;  moving all 4 extremities equally.  Psych: affect and mood normal          Data:   All laboratory data reviewed:    UA RESULTS:  Recent Labs   Lab Test 09/30/21  0809   COLOR Yellow   APPEARANCE Clear   URINEGLC Negative   URINEBILI Small*   URINEKETONE Trace*   SG 1.020   UBLD Trace*   URINEPH 5.5   PROTEIN 30 *   UROBILINOGEN 0.2   NITRITE Negative   LEUKEST Trace*   RBCU 0-2   WBCU 0-5     Lab Results   Component Value Date    CR 1.40 09/08/2021             Impression and Plan:   Impression:   51-year-old man with a possible UTI several months ago and doing well with no symptoms      Plan:   History of possible UTI  - We discussed the pathophysiology of the bladder and prostate and the normal changes associated with development of BPH and LUTS  - I reviewed his urinalysis which was concerning from September, however his urine culture was without significant bacteria  - Given that he is doing well with no symptoms at this time, we  discussed that no further work-up is needed  - Should his symptoms return or he develop another urinary tract infection he should contact my office for follow-up     Thank you for the kind consultation.    Time spent: 20 minutes spent on the date of the encounter doing chart review, history and exam, documentation and further activities as noted above.    Rasheed Nunez MD   Urology  [facility]  [facility] Phone: [phone]  [facility] Phone: [phone]

## 2022-01-18 NOTE — NURSING NOTE
Richard J Bloch Jr.'s goals for this visit include:   Chief Complaint   Patient presents with     New Patient     UTIs       He requests these members of his care team be copied on today's visit information:     PCP: Raj Shen    Referring Provider:  Referred Self, MD  No address on file    There were no vitals taken for this visit.    Do you need any medication refills at today's visit?     Rachelle Alvarez LPN on 1/18/2022 at 12:28 PM

## 2022-05-03 ENCOUNTER — OFFICE VISIT (OUTPATIENT)
Dept: URGENT CARE | Facility: URGENT CARE | Age: 52
End: 2022-05-03
Payer: COMMERCIAL

## 2022-05-03 VITALS
SYSTOLIC BLOOD PRESSURE: 118 MMHG | RESPIRATION RATE: 16 BRPM | BODY MASS INDEX: 27.12 KG/M2 | DIASTOLIC BLOOD PRESSURE: 78 MMHG | WEIGHT: 189 LBS | OXYGEN SATURATION: 95 % | TEMPERATURE: 99.5 F | HEART RATE: 94 BPM

## 2022-05-03 DIAGNOSIS — Z20.822 SUSPECTED 2019 NOVEL CORONAVIRUS INFECTION: Primary | ICD-10-CM

## 2022-05-03 DIAGNOSIS — J06.9 UPPER RESPIRATORY TRACT INFECTION, UNSPECIFIED TYPE: ICD-10-CM

## 2022-05-03 LAB — SARS-COV-2 RNA RESP QL NAA+PROBE: NEGATIVE

## 2022-05-03 PROCEDURE — 99214 OFFICE O/P EST MOD 30 MIN: CPT | Performed by: PHYSICIAN ASSISTANT

## 2022-05-03 PROCEDURE — U0003 INFECTIOUS AGENT DETECTION BY NUCLEIC ACID (DNA OR RNA); SEVERE ACUTE RESPIRATORY SYNDROME CORONAVIRUS 2 (SARS-COV-2) (CORONAVIRUS DISEASE [COVID-19]), AMPLIFIED PROBE TECHNIQUE, MAKING USE OF HIGH THROUGHPUT TECHNOLOGIES AS DESCRIBED BY CMS-2020-01-R: HCPCS | Performed by: PHYSICIAN ASSISTANT

## 2022-05-03 PROCEDURE — U0005 INFEC AGEN DETEC AMPLI PROBE: HCPCS | Performed by: PHYSICIAN ASSISTANT

## 2022-05-03 RX ORDER — CODEINE PHOSPHATE AND GUAIFENESIN 10; 100 MG/5ML; MG/5ML
1-2 SOLUTION ORAL
Qty: 60 ML | Refills: 0 | Status: SHIPPED | OUTPATIENT
Start: 2022-05-03 | End: 2024-04-02

## 2022-05-03 NOTE — PROGRESS NOTES
SUBJECTIVE:  Richard J Bloch Jr. is a 52 year old male who presents to the clinic today with a chief complaint of fatigue for 5 day(s). Productive cough, worsening.        Past Medical History:   Diagnosis Date     Vasquez's esophagus      Hypertriglyceridemia        Current Outpatient Medications   Medication Sig Dispense Refill     lisinopril-hydrochlorothiazide (ZESTORETIC) 20-25 MG tablet TAKE 1 TABLET BY MOUTH EVERY DAY (1 MON INS) 30 tablet 11     metoprolol succinate ER (TOPROL-XL) 50 MG 24 hr tablet Take 1 tablet (50 mg) by mouth daily 90 tablet 3     omeprazole (PRILOSEC) 20 MG DR capsule TAKE 1 CAPSULE BY MOUTH EVERY DAY (1 MON INS) 90 capsule 3     sildenafil (REVATIO) 20 MG tablet Take 3-4 tabs as directed before sexual activity 90 tablet 3       Social History     Tobacco Use     Smoking status: Never Smoker     Smokeless tobacco: Never Used   Substance Use Topics     Alcohol use: Not on file       ROS  Review of systems negative except as stated above.    OBJECTIVE:  /78 (BP Location: Right arm, Patient Position: Sitting, Cuff Size: Adult Large)   Pulse 94   Temp 99.5  F (37.5  C) (Tympanic)   Resp 16   Wt 85.7 kg (189 lb)   SpO2 95%   BMI 27.12 kg/m    GENERAL APPEARANCE: healthy, alert and no distress  EYES: EOMI,  PERRL, conjunctiva clear  HENT: ear canals and TM's normal.  Nose and mouth without ulcers, erythema or lesions  NECK: supple, nontender, no lymphadenopathy  RESP: lungs clear to auscultation - no rales, rhonchi or wheezes  CV: regular rates and rhythm, normal S1 S2, no murmur noted  NEURO: Normal strength and tone, sensory exam grossly normal,  normal speech and mentation  SKIN: no suspicious lesions or rashes      Results for orders placed or performed in visit on 05/03/22   Symptomatic; Yes; 4/28/2022 COVID-19 Virus (Coronavirus) by PCR Nose     Status: Normal    Specimen: Nose; Swab   Result Value Ref Range    SARS CoV2 PCR Negative Negative, Testing sent to reference  lab. Results will be returned via unsolicited result    Narrative    Testing was performed using the Xpert Xpress SARS-CoV-2 Assay on the  Cepheid Gene-Xpert Instrument Systems. Additional information about  this Emergency Use Authorization (EUA) assay can be found via the Lab  Guide. This test should be ordered for the detection of SARS-CoV-2 in  individuals who meet SARS-CoV-2 clinical and/or epidemiological  criteria. Test performance is unknown in asymptomatic patients. This  test is for in vitro diagnostic use under the FDA EUA for  laboratories certified under CLIA to perform high complexity testing.  This test has not been FDA cleared or approved. A negative result  does not rule out the presence of PCR inhibitors in the specimen or  target RNA in concentration below the limit of detection for the  assay. The possibility of a false negative should be considered if  the patient's recent exposure or clinical presentation suggests  COVID-19. This test was validated by the [facility]. This laboratory is certified under  the Clinical Laboratory Improvement Amendments of 1988 (CLIA-88) as  qualified to perform high complexity laboratory testing.         ASSESSMENT:    (Z20.822) Suspected 2019 novel coronavirus infection  (primary encounter diagnosis)  (J06.9) Upper respiratory tract infection, unspecified type  Plan: Symptomatic; Yes; 4/28/2022 COVID-19 Virus         (Coronavirus) by PCR Nose, guaiFENesin-codeine         (ROBITUSSIN AC) 100-10 MG/5ML solution      Covid-19  Pt was evaluated during a global COVID-19 pandemic, which necessitated consideration that the patient might be at risk for infection with the SARS-CoV-2 virus that causes COVID-19.   Applicable protocols for evaluation were followed during the patient's care.   COVID-19 was considered as part of the patient's evaluation. The plan for testing is:  a test was ordered during this visit.    No severe  headache, chest pain, shortness of breath  No additional infectious symptoms  Rest, isolate for results, hydrate, follow up if worsening or new symptoms  Unvaccinated HH members to isolate until test results, if positive isolate for 2 weeks and follow up for testing if symptoms occur  Red flags and emergent follow up discussed, and understood by patient  Follow up with PCP if symptoms worsen or fail to improve    Surgical mask, shield, gloves worn by provider      Patient Instructions   Mucinex, rest, fluids!    Follow up with new fever, pain, shortness of breath

## 2022-09-25 ENCOUNTER — HEALTH MAINTENANCE LETTER (OUTPATIENT)
Age: 52
End: 2022-09-25

## 2022-11-08 DIAGNOSIS — I10 ESSENTIAL HYPERTENSION WITH GOAL BLOOD PRESSURE LESS THAN 140/90: ICD-10-CM

## 2022-11-10 NOTE — TELEPHONE ENCOUNTER
"Former patient of Shen & has not established care with another provider.  Please assign refill request to covering provider per clinic standard process.    Routing refill request to provider for review/approval because:  Patient needs to be seen because it has been more than 1 year since last office visit.    Last Written Prescription Date:  11/10/21  Last Fill Quantity: 30,  # refills: 11   Last office visit provider:  9/30/21     Requested Prescriptions   Pending Prescriptions Disp Refills     lisinopril-hydrochlorothiazide (ZESTORETIC) 20-25 MG tablet 30 tablet 11     Sig: TAKE 1 TABLET BY MOUTH EVERY DAY (1 MON INS)  Strength: 20-25 mg       Diuretics (Including Combos) Protocol Failed - 11/8/2022  2:15 PM        Failed - Recent (12 mo) or future (30 days) visit within the authorizing provider's specialty     Patient has had an office visit with the authorizing provider or a provider within the authorizing providers department within the previous 12 mos or has a future within next 30 days. See \"Patient Info\" tab in inbasket, or \"Choose Columns\" in Meds & Orders section of the refill encounter.              Failed - Normal serum creatinine on file in past 12 months     Recent Labs   Lab Test 09/08/21  1455   CR 1.40*              Failed - Normal serum potassium on file in past 12 months     Recent Labs   Lab Test 09/08/21  1455   POTASSIUM 3.9                    Failed - Normal serum sodium on file in past 12 months     Recent Labs   Lab Test 09/08/21  1455                 Passed - Blood pressure under 140/90 in past 12 months     BP Readings from Last 3 Encounters:   05/03/22 118/78   09/30/21 96/68   09/08/21 92/67                 Passed - Medication is active on med list        Passed - Patient is age 18 or older       ACE Inhibitors (Including Combos) Protocol Failed - 11/8/2022  2:15 PM        Failed - Recent (12 mo) or future (30 days) visit within the authorizing provider's specialty     Patient has " "had an office visit with the authorizing provider or a provider within the authorizing providers department within the previous 12 mos or has a future within next 30 days. See \"Patient Info\" tab in inbasket, or \"Choose Columns\" in Meds & Orders section of the refill encounter.              Failed - Normal serum creatinine on file in past 12 months     Recent Labs   Lab Test 09/08/21  1455   CR 1.40*       Ok to refill medication if creatinine is low          Failed - Normal serum potassium on file in past 12 months     Recent Labs   Lab Test 09/08/21  1455   POTASSIUM 3.9             Passed - Blood pressure under 140/90 in past 12 months     BP Readings from Last 3 Encounters:   05/03/22 118/78   09/30/21 96/68   09/08/21 92/67                 Passed - Medication is active on med list        Passed - Patient is age 18 or older             Meron Isaac, RN 11/10/22 4:09 PM  "

## 2022-11-25 NOTE — TELEPHONE ENCOUNTER
"Former patient of ??? & has not established care with another provider.  Please assign refill request to covering provider per clinic standard process.    Routing refill request to provider for review/approval because:  Labs not current:  Multiple  Patient needs to be seen because it has been more than 1 year since last office visit.  No PCP  Please route to the on call provider for refill    Last Written Prescription Date:  11/10/21  Last Fill Quantity: 30,  # refills: 11   Last office visit provider:  9/30/21     Requested Prescriptions   Pending Prescriptions Disp Refills     lisinopril-hydrochlorothiazide (ZESTORETIC) 20-25 MG tablet 30 tablet 11     Sig: TAKE 1 TABLET BY MOUTH EVERY DAY (1 MON INS)  Strength: 20-25 mg       Diuretics (Including Combos) Protocol Failed - 11/25/2022  1:25 PM        Failed - Recent (12 mo) or future (30 days) visit within the authorizing provider's specialty     Patient has had an office visit with the authorizing provider or a provider within the authorizing providers department within the previous 12 mos or has a future within next 30 days. See \"Patient Info\" tab in inbasket, or \"Choose Columns\" in Meds & Orders section of the refill encounter.              Failed - Normal serum creatinine on file in past 12 months     Recent Labs   Lab Test 09/08/21  1455   CR 1.40*              Failed - Normal serum potassium on file in past 12 months     Recent Labs   Lab Test 09/08/21  1455   POTASSIUM 3.9                    Failed - Normal serum sodium on file in past 12 months     Recent Labs   Lab Test 09/08/21  1455                 Passed - Blood pressure under 140/90 in past 12 months     BP Readings from Last 3 Encounters:   05/03/22 118/78   09/30/21 96/68   09/08/21 92/67                 Passed - Medication is active on med list        Passed - Patient is age 18 or older       ACE Inhibitors (Including Combos) Protocol Failed - 11/25/2022  1:25 PM        Failed - Recent (12 mo) " "or future (30 days) visit within the authorizing provider's specialty     Patient has had an office visit with the authorizing provider or a provider within the authorizing providers department within the previous 12 mos or has a future within next 30 days. See \"Patient Info\" tab in inbasket, or \"Choose Columns\" in Meds & Orders section of the refill encounter.              Failed - Normal serum creatinine on file in past 12 months     Recent Labs   Lab Test 09/08/21  1455   CR 1.40*       Ok to refill medication if creatinine is low          Failed - Normal serum potassium on file in past 12 months     Recent Labs   Lab Test 09/08/21  1455   POTASSIUM 3.9             Passed - Blood pressure under 140/90 in past 12 months     BP Readings from Last 3 Encounters:   05/03/22 118/78   09/30/21 96/68   09/08/21 92/67                 Passed - Medication is active on med list        Passed - Patient is age 18 or older             Edson Hooks RN 11/25/22 1:26 PM  " Left arm;

## 2022-11-25 NOTE — TELEPHONE ENCOUNTER
Incorrectly routed back to medication refill pool. FNA is unable to fill this request. Please assign refill request to covering provider per clinic standard process and follow appropriately workflow for no established PCP.    Kendra Coffey RN, BSN Nurse Triage Advisor Supervisor 2:57 PM 11/25/2022

## 2022-12-01 RX ORDER — LISINOPRIL AND HYDROCHLOROTHIAZIDE 20; 25 MG/1; MG/1
TABLET ORAL
Qty: 90 TABLET | Refills: 1 | Status: SHIPPED | OUTPATIENT
Start: 2022-12-01 | End: 2023-02-28

## 2022-12-01 NOTE — TELEPHONE ENCOUNTER
"To Dr. Brooke to review.   Pt has establish care apt on 1/30/23    Routing refill request to provider for review/approval because:  Labs not current:  creatinine, potassium, sodium  Patient needs to be seen because it has been more than 1 year since last office visit.    Requested Prescriptions   Pending Prescriptions Disp Refills    lisinopril-hydrochlorothiazide (ZESTORETIC) 20-25 MG tablet 30 tablet 11     Sig: TAKE 1 TABLET BY MOUTH EVERY DAY (1 MON INS)  Strength: 20-25 mg       Diuretics (Including Combos) Protocol Failed - 11/25/2022  2:58 PM        Failed - Recent (12 mo) or future (30 days) visit within the authorizing provider's specialty     Patient has had an office visit with the authorizing provider or a provider within the authorizing providers department within the previous 12 mos or has a future within next 30 days. See \"Patient Info\" tab in inbasket, or \"Choose Columns\" in Meds & Orders section of the refill encounter.              Failed - Normal serum creatinine on file in past 12 months     Recent Labs   Lab Test 09/08/21  1455   CR 1.40*              Failed - Normal serum potassium on file in past 12 months     Recent Labs   Lab Test 09/08/21  1455   POTASSIUM 3.9                    Failed - Normal serum sodium on file in past 12 months     Recent Labs   Lab Test 09/08/21  1455                 Passed - Blood pressure under 140/90 in past 12 months     BP Readings from Last 3 Encounters:   05/03/22 118/78   09/30/21 96/68   09/08/21 92/67                 Passed - Medication is active on med list        Passed - Patient is age 18 or older       ACE Inhibitors (Including Combos) Protocol Failed - 11/25/2022  2:58 PM        Failed - Recent (12 mo) or future (30 days) visit within the authorizing provider's specialty     Patient has had an office visit with the authorizing provider or a provider within the authorizing providers department within the previous 12 mos or has a future within next 30 " "days. See \"Patient Info\" tab in inbasket, or \"Choose Columns\" in Meds & Orders section of the refill encounter.              Failed - Normal serum creatinine on file in past 12 months     Recent Labs   Lab Test 09/08/21  1455   CR 1.40*       Ok to refill medication if creatinine is low          Failed - Normal serum potassium on file in past 12 months     Recent Labs   Lab Test 09/08/21  1455   POTASSIUM 3.9             Passed - Blood pressure under 140/90 in past 12 months     BP Readings from Last 3 Encounters:   05/03/22 118/78   09/30/21 96/68   09/08/21 92/67                 Passed - Medication is active on med list        Passed - Patient is age 18 or older           Sophie Yates RN, BSN  [facility]    "

## 2022-12-02 DIAGNOSIS — K21.9 GASTROESOPHAGEAL REFLUX DISEASE WITHOUT ESOPHAGITIS: ICD-10-CM

## 2022-12-05 NOTE — TELEPHONE ENCOUNTER
"Former patient of Shen & has not established care with another provider.  Please assign refill request to covering provider per clinic standard process.    Routing refill request to provider for review/approval because:  Patient needs to be seen because it has been more than 1 year since last office visit.  No PCP    Last Written Prescription Date:  12/2/21  Last Fill Quantity: 90,  # refills: 3   Last office visit provider:  9/30/21     Requested Prescriptions   Pending Prescriptions Disp Refills     omeprazole (PRILOSEC) 20 MG DR capsule 90 capsule 3     Sig: TAKE 1 CAPSULE BY MOUTH EVERY DAY (1 MON INS)  Strength: 20 mg       PPI Protocol Failed - 12/5/2022  8:40 AM        Failed - Recent (12 mo) or future (30 days) visit within the authorizing provider's specialty     Patient has had an office visit with the authorizing provider or a provider within the authorizing providers department within the previous 12 mos or has a future within next 30 days. See \"Patient Info\" tab in inbasket, or \"Choose Columns\" in Meds & Orders section of the refill encounter.              Passed - Not on Clopidogrel (unless Pantoprazole ordered)        Passed - No diagnosis of osteoporosis on record        Passed - Medication is active on med list        Passed - Patient is age 18 or older             Edson Hooks RN 12/05/22 8:40 AM  "

## 2022-12-21 NOTE — TELEPHONE ENCOUNTER
Patient has previous seen Dr. Shen and has an appointment with Neyda on 1/20/23.   Could someone give him a short term refill to get him through as Neyda is not comfortable filling before their meeting?    Order Pended.

## 2023-02-03 DIAGNOSIS — K21.9 GASTROESOPHAGEAL REFLUX DISEASE WITHOUT ESOPHAGITIS: ICD-10-CM

## 2023-02-05 NOTE — TELEPHONE ENCOUNTER
"Former patient of Shen & has not established care with another provider.  Please assign refill request to covering provider per clinic standard process.      Routing refill request to provider for review/approval because:  Patient needs to be seen because it has been more than 1 year since last office visit.    Last Written Prescription Date:  12/21/22  Last Fill Quantity: 30,  # refills: 0   Last office visit provider:  9/30/21     Requested Prescriptions   Pending Prescriptions Disp Refills     omeprazole (PRILOSEC) 20 MG DR capsule 30 capsule 0     Sig: TAKE 1 CAPSULE BY MOUTH EVERY DAY.       PPI Protocol Failed - 2/3/2023  4:21 PM        Failed - Recent (12 mo) or future (30 days) visit within the authorizing provider's specialty     Patient has had an office visit with the authorizing provider or a provider within the authorizing providers department within the previous 12 mos or has a future within next 30 days. See \"Patient Info\" tab in inbasket, or \"Choose Columns\" in Meds & Orders section of the refill encounter.              Passed - Not on Clopidogrel (unless Pantoprazole ordered)        Passed - No diagnosis of osteoporosis on record        Passed - Medication is active on med list        Passed - Patient is age 18 or older             Meron Isaac RN 02/04/23 6:44 PM  "

## 2023-02-28 ENCOUNTER — OFFICE VISIT (OUTPATIENT)
Dept: FAMILY MEDICINE | Facility: CLINIC | Age: 53
End: 2023-02-28
Payer: COMMERCIAL

## 2023-02-28 ENCOUNTER — TELEPHONE (OUTPATIENT)
Dept: FAMILY MEDICINE | Facility: CLINIC | Age: 53
End: 2023-02-28

## 2023-02-28 VITALS
TEMPERATURE: 98.3 F | SYSTOLIC BLOOD PRESSURE: 116 MMHG | DIASTOLIC BLOOD PRESSURE: 80 MMHG | OXYGEN SATURATION: 98 % | HEART RATE: 83 BPM | WEIGHT: 198 LBS | RESPIRATION RATE: 12 BRPM | HEIGHT: 70 IN | BODY MASS INDEX: 28.35 KG/M2

## 2023-02-28 DIAGNOSIS — Z12.11 SCREEN FOR COLON CANCER: ICD-10-CM

## 2023-02-28 DIAGNOSIS — N52.9 ERECTILE DYSFUNCTION, UNSPECIFIED ERECTILE DYSFUNCTION TYPE: ICD-10-CM

## 2023-02-28 DIAGNOSIS — Z12.5 SCREENING PSA (PROSTATE SPECIFIC ANTIGEN): ICD-10-CM

## 2023-02-28 DIAGNOSIS — K76.0 FATTY LIVER: ICD-10-CM

## 2023-02-28 DIAGNOSIS — I10 ESSENTIAL HYPERTENSION WITH GOAL BLOOD PRESSURE LESS THAN 140/90: ICD-10-CM

## 2023-02-28 DIAGNOSIS — K21.9 GASTROESOPHAGEAL REFLUX DISEASE WITHOUT ESOPHAGITIS: ICD-10-CM

## 2023-02-28 DIAGNOSIS — Z11.4 SCREENING FOR HIV (HUMAN IMMUNODEFICIENCY VIRUS): ICD-10-CM

## 2023-02-28 DIAGNOSIS — E78.1 HYPERTRIGLYCERIDEMIA: Primary | ICD-10-CM

## 2023-02-28 PROCEDURE — 99214 OFFICE O/P EST MOD 30 MIN: CPT | Performed by: NURSE PRACTITIONER

## 2023-02-28 RX ORDER — SILDENAFIL CITRATE 20 MG/1
TABLET ORAL
Qty: 90 TABLET | Refills: 3 | Status: SHIPPED | OUTPATIENT
Start: 2023-02-28 | End: 2024-04-02

## 2023-02-28 RX ORDER — LISINOPRIL AND HYDROCHLOROTHIAZIDE 20; 25 MG/1; MG/1
TABLET ORAL
Qty: 90 TABLET | Refills: 3 | Status: SHIPPED | OUTPATIENT
Start: 2023-02-28 | End: 2024-04-02

## 2023-02-28 NOTE — PATIENT INSTRUCTIONS
Hello;    Thank you for allowing me to be a part of your care.  If you have any other questions or concerns about your visit, please do not hesitate to call us, or send a message through the online messaging system.     Some take away points that I like patients to know is or remember are:    Vitamin D is an important supplement, especially in these long winter days. Typically 1000 to 2000 international unit(s) per day is a sufficient dose. This can be bought over the counter.     Calcium Recommendations:  14 to 18 years old: 1,300 mg--this can be through food sources  19 to 30 years old: 1,000 mg--this can be through food sources  31 to 50 years old: 1,000 mg  51 to 70 years old, women: 1,200 mg  51 to 70 years old, men: 1,000 mg  Pregnant or nursin to 18 years old: 1,300 mg, 19 to 50 years old: 1,000 mg  Older than 70 (women and men): 1,200 mg    For the Shingrix Vaccine; we recommend you double check with your insurance to make sure it is covered at our clinic.    A triage nurse is available to help you over the phone with questions during clinic hours. If after hours, please call the [place] nurse-line.     Ways to get your review your results on labs/imaging is through [employer]. Other ways is by us calling you via the phone or sending you a letter. If labs are not alarming (ie critical), it can take up to one to two weeks to get a response from me, but I try my hardest to respond quickly.     If you have mychart, you will be able to review your labs and tests sometimes prior to me seeing them. If anything is alarming (critical), we do have ancillary staff that also review tests while I am gone, and will contact you.     And, as always, you can contact the triage nurse if you have any concerns.     Please take care!      VIK Owens CNP

## 2023-02-28 NOTE — TELEPHONE ENCOUNTER
PRIOR AUTHORIZATION DENIED    Medication: sildenafil (REVATIO) 20 MG tablet    Denial Date: 02/2/8/23     Denial Rational: med not covered for DX      Appeal Information:

## 2023-10-14 ENCOUNTER — HEALTH MAINTENANCE LETTER (OUTPATIENT)
Age: 53
End: 2023-10-14

## 2023-11-27 DIAGNOSIS — I10 ESSENTIAL HYPERTENSION WITH GOAL BLOOD PRESSURE LESS THAN 140/90: ICD-10-CM

## 2023-11-27 RX ORDER — METOPROLOL SUCCINATE 50 MG/1
50 TABLET, EXTENDED RELEASE ORAL DAILY
Qty: 90 TABLET | Refills: 0 | Status: SHIPPED | OUTPATIENT
Start: 2023-11-27 | End: 2024-02-21

## 2024-02-13 DIAGNOSIS — K21.9 GASTROESOPHAGEAL REFLUX DISEASE WITHOUT ESOPHAGITIS: ICD-10-CM

## 2024-02-13 NOTE — TELEPHONE ENCOUNTER
Refill Request  Medication name: Pending Prescriptions:                       Disp   Refills    omeprazole (PRILOSEC) 20 MG DR capsule    90 cap*3            Sig: TAKE 1 CAPSULE BY MOUTH EVERY DAY.    Requested Pharmacy:  [facility] [id number] [facility] - [address] [place], [place] - [address]

## 2024-02-19 ENCOUNTER — E-VISIT (OUTPATIENT)
Dept: URGENT CARE | Facility: CLINIC | Age: 54
End: 2024-02-19
Payer: COMMERCIAL

## 2024-02-19 DIAGNOSIS — R30.0 DIFFICULT OR PAINFUL URINATION: Primary | ICD-10-CM

## 2024-02-19 PROCEDURE — 99421 OL DIG E/M SVC 5-10 MIN: CPT | Performed by: NURSE PRACTITIONER

## 2024-02-19 NOTE — PATIENT INSTRUCTIONS
Dear Richard J Bloch Jr.,     After reviewing your responses, I would like you to come in for a urine test to make sure we treat you correctly. This urine test is to evaluate you for a possible urinary tract infection, and should be scheduled for today or tomorrow. Schedule a Lab Only appointment here.     Lab appointments are not available at most locations on the weekends. If no Lab Only appointment is available, you should be seen in any of our convenient Walk-in or Urgent Care Centers, which can be found on our website here.     You will receive instructions with your results in [employer] once they are available.     If your symptoms worsen, you develop pain in your back or stomach, develop fevers, or are not improving in 5 days, please contact your primary care provider for an appointment or visit a Walk-in or Urgent Care Center to be seen.     Thanks again for choosing us as your health care partner,     Tiffany Farmer CNP  Urinary Tract Infections (UTI) in Men: Care Instructions  Overview     A urinary tract infection, or UTI, is a term for an infection anywhere between the kidneys and the urethra. (The urethra is the tube that carries urine from the bladder to outside the body.) Most UTIs are bladder infections. They often cause pain or burning when you urinate.  UTIs are caused by bacteria. This means they can be cured with antibiotics. Be sure to complete your treatment so that the infection does not get worse.  Follow-up care is a key part of your treatment and safety. Be sure to make and go to all appointments, and call your doctor if you are having problems. It's also a good idea to know your test results and keep a list of the medicines you take.  How can you care for yourself at home?  Take your antibiotics as prescribed. Do not stop taking them just because you feel better. You need to take the full course of antibiotics.  Take your medicines exactly as prescribed. Your doctor may have prescribed  a medicine, such as phenazopyridine (Pyridium), to help relieve pain when you urinate. This turns your urine orange. You may stop taking it when your symptoms get better. But be sure to take all of your antibiotics, which treat the infection.  Drink extra water for the next day or two. This will help make the urine less concentrated and help wash out the bacteria causing the infection. (If you have kidney, heart, or liver disease and have to limit your fluids, talk with your doctor before you increase your fluid intake.)  Avoid drinks that are carbonated or have caffeine. They can irritate the bladder.  Urinate often. Try to empty your bladder each time.  To relieve pain, take a hot bath or lay a heating pad (set on low) over your lower belly or genital area. Never go to sleep with a heating pad in place.  To help prevent UTIs  Drink plenty of fluids. If you have kidney, heart, or liver disease and have to limit fluids, talk with your doctor before you increase the amount of fluids you drink.  Urinate when you have the urge. Do not hold your urine for a long time. Urinate before you go to sleep.  Keep your penis clean.  Catheter care  If you have a drainage tube (catheter) in place, the following steps will help you care for it.  Always wash your hands before and after touching your catheter.  Check the area around the urethra for inflammation or signs of infection. Signs of infection include irritated, swollen, red, or tender skin, or pus around the catheter.  Clean the area around the catheter with soap and water two times a day. Dry with a clean towel afterward.  Do not apply powder or lotion to the skin around the catheter.  To empty the urine collection bag   Wash your hands with soap and water.  Without touching the drain spout, remove the spout from its sleeve at the bottom of the collection bag. Open the valve on the spout.  Let the urine flow out of the bag and into the toilet or a container. Do not let the  "tubing or drain spout touch anything.  After you empty the bag, clean the end of the drain spout with tissue and water. Close the valve and put the drain spout back into its sleeve at the bottom of the collection bag.  Wash your hands with soap and water.  When should you call for help?   Call your doctor now or seek immediate medical care if:    Symptoms such as a fever, chills, nausea, or vomiting get worse or happen for the first time.     You have new pain in your back just below your rib cage. This is called flank pain.     There is new blood or pus in your urine.     You are not able to take or keep down your antibiotics.   Watch closely for changes in your health, and be sure to contact your doctor if:    You are not getting better after taking an antibiotic for 2 days.     Your symptoms go away but then come back.   Where can you learn more?  Go to https://www.[employer].net/patiented  Enter S351 in the search box to learn more about \"Urinary Tract Infections (UTI) in Men: Care Instructions.\"  Current as of: February 28, 2023               Content Version: 13.8    2204-2500 [employer].   Care instructions adapted under license by your healthcare professional. If you have questions about a medical condition or this instruction, always ask your healthcare professional. [employer] disclaims any warranty or liability for your use of this information.      "

## 2024-02-20 ENCOUNTER — LAB (OUTPATIENT)
Dept: LAB | Facility: CLINIC | Age: 54
End: 2024-02-20
Payer: COMMERCIAL

## 2024-02-20 DIAGNOSIS — R30.0 DIFFICULT OR PAINFUL URINATION: ICD-10-CM

## 2024-02-20 LAB
ALBUMIN UR-MCNC: NEGATIVE MG/DL
APPEARANCE UR: CLEAR
BILIRUB UR QL STRIP: ABNORMAL
COLOR UR AUTO: YELLOW
GLUCOSE UR STRIP-MCNC: NEGATIVE MG/DL
HGB UR QL STRIP: NEGATIVE
KETONES UR STRIP-MCNC: NEGATIVE MG/DL
LEUKOCYTE ESTERASE UR QL STRIP: NEGATIVE
NITRATE UR QL: NEGATIVE
PH UR STRIP: 5.5 [PH] (ref 5–7)
SP GR UR STRIP: 1.01 (ref 1–1.03)
UROBILINOGEN UR STRIP-ACNC: 0.2 E.U./DL

## 2024-02-20 PROCEDURE — 81003 URINALYSIS AUTO W/O SCOPE: CPT

## 2024-03-07 ENCOUNTER — OFFICE VISIT (OUTPATIENT)
Dept: UROLOGY | Facility: CLINIC | Age: 54
End: 2024-03-07
Payer: COMMERCIAL

## 2024-03-07 DIAGNOSIS — R39.198 DIFFICULTY URINATING: Primary | ICD-10-CM

## 2024-03-07 PROCEDURE — 99213 OFFICE O/P EST LOW 20 MIN: CPT | Mod: 25 | Performed by: UROLOGY

## 2024-03-07 PROCEDURE — 51798 US URINE CAPACITY MEASURE: CPT | Performed by: UROLOGY

## 2024-03-07 NOTE — NURSING NOTE
Richard J Bloch Jr.'s goals for this visit include:   Chief Complaint   Patient presents with    RECHECK     Difficulty with urination - without pain       He requests these members of his care team be copied on today's visit information:     PCP: Naye Faye    Referring Provider:  Referred Self, MD  No address on file    There were no vitals taken for this visit.    Do you need any medication refills at today's visit?     post void residual: 31 mL    Hilaria Adams MA on 3/7/2024 at 2:37 PM

## 2024-03-07 NOTE — PROGRESS NOTES
"[facility]  CHIEF COMPLAINT   It was my pleasure to see Richard J Bloch Jr. who is a 53 year old male for follow-up of LUTS.      HPI   Richard J Bloch Jr. is a very pleasant 53 year old male     Initially seen 1/18/2022:  Richard J Bloch Jr. is a 51 year old male who is being seen for evaluation of Recent UTI     Had a UTI in September - though UCx negative   This required a few rounds of abx for symptoms to resolve  He has been doing well since that time  No LUTS   No gross hematuria, acute urinary retention     One other UTI in his 30s     No family history of prostate cancer or prostate issues    TODAY 3/7/2024:  He had been doing well until a few weeks ago when he difficulty voiding  Diagnosed with COVID and now recovered  Now back to urinating well    AUASS: 1-6-2-2-3-3-2 = 18  QOL = 2  PVR = 31cc     PHYSICAL EXAM  Patient is a 53 year old  male   Vitals: There were no vitals taken for this visit.  There is no height or weight on file to calculate BMI.  General Appearance Adult:   Alert, no acute distress, oriented  HENT: throat/mouth:normal, good dentition  Lungs: no respiratory distress, or pursed lip breathing  Heart: No obvious jugular venous distension present  Neuro: Alert, oriented, speech and mentation normal  Psych: affect and mood normal  Gait: Normal    UA RESULTS:  Recent Labs   Lab Test 02/20/24  1410 09/30/21  0809   COLOR Yellow Yellow   APPEARANCE Clear Clear   URINEGLC Negative Negative   URINEBILI Small* Small*   URINEKETONE Negative Trace*   SG 1.015 1.020   UBLD Negative Trace*   URINEPH 5.5 5.5   PROTEIN Negative 30*   UROBILINOGEN 0.2 0.2   NITRITE Negative Negative   LEUKEST Negative Trace*   RBCU  --  0-2   WBCU  --  0-5      Creatinine   Date Value Ref Range Status   09/08/2021 1.40 (H) 0.66 - 1.25 mg/dL Final      No results found for: \"PSA\"         ASSESSMENT and PLAN  53-year-old man with prior history of urinary tract infection and recent episode of difficulty voiding during " symptomatic COVID    LUTS and recent set of difficulty urinating  - Reviewed his labs with a unremarkable recent urinalysis  - We again discussed the pathophysiology of the bladder and the prostate and normal changes associated with the development of BPH and LUTS  - He is now voiding well with a low PVR  - We discussed that treatment options would be continued observation versus consideration for an alpha-blocker such as tamsulosin.  - She would like to continue with observation at this time which is very reasonable  - He may follow-up with me on a as needed basis      Time spent: 15 minutes spent on the date of the encounter doing chart review, history and exam, documentation and further activities as noted above.    Rasheed Nunez MD   Urology  [facility]  [facility] Phone: [phone]  [facility] Phone: [phone]

## 2024-04-02 ENCOUNTER — OFFICE VISIT (OUTPATIENT)
Dept: FAMILY MEDICINE | Facility: CLINIC | Age: 54
End: 2024-04-02
Payer: COMMERCIAL

## 2024-04-02 VITALS
BODY MASS INDEX: 26.81 KG/M2 | RESPIRATION RATE: 14 BRPM | DIASTOLIC BLOOD PRESSURE: 83 MMHG | TEMPERATURE: 97.6 F | SYSTOLIC BLOOD PRESSURE: 123 MMHG | WEIGHT: 181 LBS | HEART RATE: 68 BPM | OXYGEN SATURATION: 100 % | HEIGHT: 69 IN

## 2024-04-02 DIAGNOSIS — D69.6 LOW PLATELET COUNT (H): ICD-10-CM

## 2024-04-02 DIAGNOSIS — E78.1 HYPERTRIGLYCERIDEMIA: ICD-10-CM

## 2024-04-02 DIAGNOSIS — E87.1 HYPONATREMIA: ICD-10-CM

## 2024-04-02 DIAGNOSIS — Z11.4 SCREENING FOR HIV (HUMAN IMMUNODEFICIENCY VIRUS): ICD-10-CM

## 2024-04-02 DIAGNOSIS — K21.9 GASTROESOPHAGEAL REFLUX DISEASE WITHOUT ESOPHAGITIS: ICD-10-CM

## 2024-04-02 DIAGNOSIS — I10 ESSENTIAL HYPERTENSION WITH GOAL BLOOD PRESSURE LESS THAN 140/90: ICD-10-CM

## 2024-04-02 DIAGNOSIS — K76.0 HEPATIC STEATOSIS: ICD-10-CM

## 2024-04-02 DIAGNOSIS — N52.9 ERECTILE DYSFUNCTION, UNSPECIFIED ERECTILE DYSFUNCTION TYPE: ICD-10-CM

## 2024-04-02 DIAGNOSIS — Z12.5 SCREENING PSA (PROSTATE SPECIFIC ANTIGEN): ICD-10-CM

## 2024-04-02 DIAGNOSIS — Z78.9 ALCOHOL USE: ICD-10-CM

## 2024-04-02 DIAGNOSIS — Z12.11 SCREEN FOR COLON CANCER: ICD-10-CM

## 2024-04-02 DIAGNOSIS — Z00.00 VISIT FOR PREVENTIVE HEALTH EXAMINATION: Primary | ICD-10-CM

## 2024-04-02 LAB
ACANTHOCYTES BLD QL SMEAR: NORMAL
AUER BODIES BLD QL SMEAR: NORMAL
BASO STIPL BLD QL SMEAR: NORMAL
BITE CELLS BLD QL SMEAR: NORMAL
BLISTER CELLS BLD QL SMEAR: NORMAL
BURR CELLS BLD QL SMEAR: NORMAL
DACRYOCYTES BLD QL SMEAR: NORMAL
ELLIPTOCYTES BLD QL SMEAR: NORMAL
ERYTHROCYTE [DISTWIDTH] IN BLOOD BY AUTOMATED COUNT: 12.7 % (ref 10–15)
FRAGMENTS BLD QL SMEAR: NORMAL
GIANT PLATELETS BLD QL SMEAR: NORMAL
HCT VFR BLD AUTO: ABNORMAL %
HGB BLD-MCNC: 13.8 G/DL (ref 13.3–17.7)
HGB C CRYSTALS: NORMAL
HOWELL-JOLLY BOD BLD QL SMEAR: NORMAL
MCH RBC QN AUTO: ABNORMAL PG
MCHC RBC AUTO-ENTMCNC: ABNORMAL G/DL
MCV RBC AUTO: ABNORMAL FL
NEUTS HYPERSEG BLD QL SMEAR: NORMAL
PATH REV: NORMAL
PLAT MORPH BLD: NORMAL
PLATELET # BLD AUTO: 84 10E3/UL (ref 150–450)
POLYCHROMASIA BLD QL SMEAR: NORMAL
RBC # BLD AUTO: ABNORMAL 10*6/UL
RBC AGGLUT BLD QL: NORMAL
RBC MORPH BLD: NORMAL
ROULEAUX BLD QL SMEAR: NORMAL
SICKLE CELLS BLD QL SMEAR: NORMAL
SMUDGE CELLS BLD QL SMEAR: NORMAL
SPHEROCYTES BLD QL SMEAR: NORMAL
STOMATOCYTES BLD QL SMEAR: NORMAL
TARGETS BLD QL SMEAR: NORMAL
TOXIC GRANULES BLD QL SMEAR: NORMAL
VARIANT LYMPHS BLD QL SMEAR: NORMAL
WBC # BLD AUTO: 4.1 10E3/UL (ref 4–11)

## 2024-04-02 PROCEDURE — 90715 TDAP VACCINE 7 YRS/> IM: CPT | Performed by: NURSE PRACTITIONER

## 2024-04-02 PROCEDURE — 80053 COMPREHEN METABOLIC PANEL: CPT | Performed by: NURSE PRACTITIONER

## 2024-04-02 PROCEDURE — 85018 HEMOGLOBIN: CPT | Performed by: NURSE PRACTITIONER

## 2024-04-02 PROCEDURE — 80061 LIPID PANEL: CPT | Performed by: NURSE PRACTITIONER

## 2024-04-02 PROCEDURE — 87389 HIV-1 AG W/HIV-1&-2 AB AG IA: CPT | Performed by: NURSE PRACTITIONER

## 2024-04-02 PROCEDURE — 85048 AUTOMATED LEUKOCYTE COUNT: CPT | Performed by: NURSE PRACTITIONER

## 2024-04-02 PROCEDURE — G0103 PSA SCREENING: HCPCS | Performed by: NURSE PRACTITIONER

## 2024-04-02 PROCEDURE — 36415 COLL VENOUS BLD VENIPUNCTURE: CPT | Performed by: NURSE PRACTITIONER

## 2024-04-02 PROCEDURE — 85049 AUTOMATED PLATELET COUNT: CPT | Performed by: NURSE PRACTITIONER

## 2024-04-02 PROCEDURE — 99214 OFFICE O/P EST MOD 30 MIN: CPT | Mod: 25 | Performed by: NURSE PRACTITIONER

## 2024-04-02 PROCEDURE — 90471 IMMUNIZATION ADMIN: CPT | Performed by: NURSE PRACTITIONER

## 2024-04-02 PROCEDURE — 99396 PREV VISIT EST AGE 40-64: CPT | Mod: 25 | Performed by: NURSE PRACTITIONER

## 2024-04-02 RX ORDER — SILDENAFIL CITRATE 20 MG/1
TABLET ORAL
Qty: 90 TABLET | Refills: 3 | Status: SHIPPED | OUTPATIENT
Start: 2024-04-02 | End: 2024-04-02

## 2024-04-02 RX ORDER — LISINOPRIL AND HYDROCHLOROTHIAZIDE 20; 25 MG/1; MG/1
TABLET ORAL
Qty: 90 TABLET | Refills: 3 | Status: SHIPPED | OUTPATIENT
Start: 2024-04-02

## 2024-04-02 RX ORDER — SILDENAFIL CITRATE 20 MG/1
TABLET ORAL
Qty: 90 TABLET | Refills: 3 | Status: SHIPPED | OUTPATIENT
Start: 2024-04-02

## 2024-04-02 RX ORDER — METOPROLOL SUCCINATE 25 MG/1
25 TABLET, EXTENDED RELEASE ORAL AT BEDTIME
Qty: 90 TABLET | Refills: 4 | Status: SHIPPED | OUTPATIENT
Start: 2024-04-02

## 2024-04-02 SDOH — HEALTH STABILITY: PHYSICAL HEALTH: ON AVERAGE, HOW MANY MINUTES DO YOU ENGAGE IN EXERCISE AT THIS LEVEL?: 40 MIN

## 2024-04-02 SDOH — HEALTH STABILITY: PHYSICAL HEALTH: ON AVERAGE, HOW MANY DAYS PER WEEK DO YOU ENGAGE IN MODERATE TO STRENUOUS EXERCISE (LIKE A BRISK WALK)?: 5 DAYS

## 2024-04-02 ASSESSMENT — SOCIAL DETERMINANTS OF HEALTH (SDOH): HOW OFTEN DO YOU GET TOGETHER WITH FRIENDS OR RELATIVES?: THREE TIMES A WEEK

## 2024-04-02 NOTE — PROGRESS NOTES
Preventive Care Visit  [facility]  VIK Owens CNP, Family Medicine  Apr 2, 2024      Assessment & Plan     Screen for colon cancer  **  - Colonoscopy Screening  Referral    Screening for HIV (human immunodeficiency virus)  *  - HIV Antigen Antibody Combo  - HIV Antigen Antibody Combo    Hypertriglyceridemia  *  - Lipid panel reflex to direct LDL Non-fasting  - Lipid panel reflex to direct LDL Non-fasting    Gastroesophageal reflux disease without esophagitis  *stable   - omeprazole (PRILOSEC) 20 MG DR capsule  Dispense: 90 capsule; Refill: 0  - CBC with platelets  - CBC with platelets  - RBC and Platelet Morphology    Essential hypertension with goal blood pressure less than 140/90  *  - lisinopril-hydrochlorothiazide (ZESTORETIC) 20-25 MG tablet  Dispense: 90 tablet; Refill: 3  - metoprolol succinate ER (TOPROL XL) 25 MG 24 hr tablet  Dispense: 90 tablet; Refill: 4  - Comprehensive metabolic panel (BMP + Alb, Alk Phos, ALT, AST, Total. Bili, TP)  - Comprehensive metabolic panel (BMP + Alb, Alk Phos, ALT, AST, Total. Bili, TP)    Erectile dysfunction, unspecified erectile dysfunction type  **  - sildenafil (REVATIO) 20 MG tablet  Dispense: 90 tablet; Refill: 3    Screening PSA (prostate specific antigen)  *  - PSA, screen  - PSA, screen    Visit for preventive health examination  *  - CBC with platelets  - CBC with platelets  - RBC and Platelet Morphology    Hepatic steatosis  *  - Comprehensive metabolic panel (BMP + Alb, Alk Phos, ALT, AST, Total. Bili, TP)  - Comprehensive metabolic panel (BMP + Alb, Alk Phos, ALT, AST, Total. Bili, TP)    Alcohol use  *- importance of detox for drinking cessation. Warnings of death, seizures, etc discussed.     Low platelet count (H24)  *  - CBC with platelets    Hyponatremia  *  - Sodium    - I called patient to discuss his labs. We discussed the impact possible lifestyle choices are having on his liver potentially. I offered an US  "of abdomen, since documented fatty liver disease, and he declined for now. I suggest he cut down on fluid intake since sodium is low. Recheck in 3 days with lab visit.   - I reached  to call patient and provide resources. He wanted information in chart confidential.   - lipids are stable.   - BP stable. No Heart disease. Toprol not first line and studies have shown that use is not indicated if no heart disease (ie MI). Decreased dose to 25 mg and will possibly discontinue. Continue lisinopril. If sodium does not go up, will need to stop hydrochlorothiazide.       Patient has been advised of split billing requirements and indicates understanding: Yes        BMI  Estimated body mass index is 26.54 kg/m  as calculated from the following:    Height as of this encounter: 1.759 m (5' 9.25\").    Weight as of this encounter: 82.1 kg (181 lb).       Counseling  Appropriate preventive services were discussed with this patient, including applicable screening as appropriate for fall prevention, nutrition, physical activity, Tobacco-use cessation, weight loss and cognition.  Checklist reviewing preventive services available has been given to the patient.  Reviewed patient's diet, addressing concerns and/or questions.   The patient reports drinking more than one alcoholic drink per day and sometimes engages in binge or excessive drinking. The patient was counseled and given information about possible harmful effects of excessive alcohol intake as well as where to get help for alcohol problems.       Keagan Garcia is a 54 year old, presenting for the following:  Physical    - he feels overall well. Plans on maybe returning to hockey with his son      4/2/2024     1:05 PM   Additional Questions   Roomed by Dario        Health Care Directive  Patient does not have a Health Care Directive or Living Will: Discussed advance care planning with patient; information given to patient to review.    Healthy Habits:     " Getting at least 3 servings of Calcium per day:  Yes    Bi-annual eye exam:  Yes    Dental care twice a year:  Yes    Sleep apnea or symptoms of sleep apnea:  Sleep apnea    Diet:  Regular (no restrictions)    Frequency of exercise:  4-5 days/week    Duration of exercise:  15-30 minutes    Taking medications regularly:  Yes    Barriers to taking medications:  None    Medication side effects:  None    Additional concerns today:  No                4/2/2024   General Health   How would you rate your overall physical health? Good   Feel stress (tense, anxious, or unable to sleep) To some extent   (!) STRESS CONCERN      4/2/2024   Nutrition   Three or more servings of calcium each day? Yes   Diet: Regular (no restrictions)   How many servings of fruit and vegetables per day? (!) 2-3   How many sweetened beverages each day? 0-1         4/2/2024   Exercise   Days per week of moderate/strenous exercise 5 days   Average minutes spent exercising at this level 40 min         4/2/2024   Social Factors   Frequency of gathering with friends or relatives Three times a week   Worry food won't last until get money to buy more No   Food not last or not have enough money for food? No   Do you have housing?  Yes   Are you worried about losing your housing? No   Lack of transportation? No   Unable to get utilities (heat,electricity)? No          No data to display                   4/2/2024   Dental   Dentist two times every year? Yes         4/2/2024   TB Screening   Were you born outside of the [place]? No         Today's PHQ-2 Score:       4/2/2024     1:01 PM   PHQ-2 ( 1999 [employer])   Q1: Little interest or pleasure in doing things 0   Q2: Feeling down, depressed or hopeless 0   PHQ-2 Score 0   Q1: Little interest or pleasure in doing things Not at all   Q2: Feeling down, depressed or hopeless Not at all   PHQ-2 Score 0           4/2/2024   Substance Use   Alcohol more than 3/day or more than 7/wk Yes   How often do you have a drink  "containing alcohol 4 or more times a week   How many alcohol drinks on typical day 3 or 4   How often do you have 5+ drinks at one occasion Weekly   Audit 2/3 Score 4   How often not able to stop drinking once started Never   How often failed to do what normally expected Less than monthly   How often needed first drink in am after a heavy drinking session Never   How often feeling of guilt or remorse after drinking Never   How often unable to remember what happened the night before Never   Have you or someone else been injured because of your drinking No   Has anyone been concerned or suggested you cut down on drinking No   TOTAL SCORE - AUDIT 9   Do you use any other substances recreationally? No     Social History     Tobacco Use    Smoking status: Never     Passive exposure: Never    Smokeless tobacco: Never   Vaping Use    Vaping Use: Never used   Substance Use Topics    Alcohol use: Yes     Comment: 5-10 friday, saturday, no sundays.             4/2/2024   One time HIV Screening   Previous HIV test? No         4/2/2024   STI Screening   New sexual partner(s) since last STI/HIV test? No   ASCVD Risk   The 10-year ASCVD risk score (Shailesh GONZALES, et al., 2019) is: 2.8%    Values used to calculate the score:      Age: 54 years      Sex: Male      Is Non- : No      Diabetic: No      Tobacco smoker: No      Systolic Blood Pressure: 123 mmHg      Is BP treated: Yes      HDL Cholesterol: 54 mg/dL      Total Cholesterol: 143 mg/dL           Reviewed and updated as needed this visit by Provider                          Review of Systems  Constitutional, HEENT, cardiovascular, pulmonary, gi and gu systems are negative, except as otherwise noted.     Objective    Exam  /83   Pulse 68   Temp 97.6  F (36.4  C) (Oral)   Resp 14   Ht 1.759 m (5' 9.25\")   Wt 82.1 kg (181 lb)   SpO2 100%   BMI 26.54 kg/m     Estimated body mass index is 26.54 kg/m  as calculated from the following:    " "Height as of this encounter: 1.759 m (5' 9.25\").    Weight as of this encounter: 82.1 kg (181 lb).    Physical Exam  GENERAL: alert and no distress  EYES: Eyes grossly normal to inspection, PERRL and conjunctivae and sclerae normal  NECK: no adenopathy, no asymmetry, masses, or scars  RESP: lungs clear to auscultation - no rales, rhonchi or wheezes  CV: regular rate and rhythm, normal S1 S2, no S3 or S4, no murmur, click or rub, no peripheral edema  ABDOMEN: soft, nontender, no hepatosplenomegaly, no masses and bowel sounds normal  MS: no gross musculoskeletal defects noted, no edema  SKIN: no suspicious lesions or rashes  NEURO: Normal strength and tone, mentation intact and speech normal  PSYCH: mentation appears normal, affect normal/bright        Signed Electronically by: VIK Owens CNP    "

## 2024-04-03 LAB
ALBUMIN SERPL BCG-MCNC: 4.5 G/DL (ref 3.5–5.2)
ALP SERPL-CCNC: 93 U/L (ref 40–150)
ALT SERPL W P-5'-P-CCNC: 59 U/L (ref 0–70)
ANION GAP SERPL CALCULATED.3IONS-SCNC: 12 MMOL/L (ref 7–15)
AST SERPL W P-5'-P-CCNC: 97 U/L (ref 0–45)
BILIRUB SERPL-MCNC: 1.1 MG/DL
BUN SERPL-MCNC: 16.1 MG/DL (ref 6–20)
CALCIUM SERPL-MCNC: 9.4 MG/DL (ref 8.6–10)
CHLORIDE SERPL-SCNC: 90 MMOL/L (ref 98–107)
CHOLEST SERPL-MCNC: 153 MG/DL
CREAT SERPL-MCNC: 1.17 MG/DL (ref 0.67–1.17)
DEPRECATED HCO3 PLAS-SCNC: 29 MMOL/L (ref 22–29)
EGFRCR SERPLBLD CKD-EPI 2021: 75 ML/MIN/1.73M2
FASTING STATUS PATIENT QL REPORTED: ABNORMAL
GLUCOSE SERPL-MCNC: 113 MG/DL (ref 70–99)
HDLC SERPL-MCNC: 63 MG/DL
HIV 1+2 AB+HIV1 P24 AG SERPL QL IA: NONREACTIVE
LDLC SERPL CALC-MCNC: 54 MG/DL
NONHDLC SERPL-MCNC: 90 MG/DL
POTASSIUM SERPL-SCNC: 4 MMOL/L (ref 3.4–5.3)
PROT SERPL-MCNC: 7.1 G/DL (ref 6.4–8.3)
PSA SERPL DL<=0.01 NG/ML-MCNC: 0.83 NG/ML (ref 0–3.5)
SODIUM SERPL-SCNC: 131 MMOL/L (ref 135–145)
TRIGL SERPL-MCNC: 181 MG/DL

## 2024-04-05 ENCOUNTER — PATIENT OUTREACH (OUTPATIENT)
Dept: CARE COORDINATION | Facility: CLINIC | Age: 54
End: 2024-04-05
Payer: COMMERCIAL

## 2024-04-05 NOTE — PROGRESS NOTES
Clinic Care Coordination Contact  [facility]/Voicemail    Clinical Data: Care Coordinator Outreach    Outreach Documentation Number of Outreach Attempt   4/5/2024   2:28 PM 1   4/8/2024   9:50 AM 2       [facility] reached out to pt per request from PCP to provide resources and support. Left message on patient's voicemail with call back information and requested return call.    Plan: Care Coordinator will do no further outreach at this time. SW[facility] gave update to PCP.

## 2024-07-01 DIAGNOSIS — K21.9 GASTROESOPHAGEAL REFLUX DISEASE WITHOUT ESOPHAGITIS: ICD-10-CM

## 2024-07-01 NOTE — TELEPHONE ENCOUNTER
Pending Prescriptions:                       Disp   Refills    omeprazole (PRILOSEC) 20 MG DR capsule    90 cap*0            Sig: TAKE 1 CAPSULE BY MOUTH EVERY DAY.    Pharmacy: [facility] [address] [place] [facility] [address] [place], [place] - [address]

## 2024-12-21 ENCOUNTER — APPOINTMENT (OUTPATIENT)
Dept: RADIOLOGY | Facility: CLINIC | Age: 54
DRG: 481 | End: 2024-12-21
Attending: PHYSICIAN ASSISTANT
Payer: COMMERCIAL

## 2024-12-21 ENCOUNTER — APPOINTMENT (OUTPATIENT)
Dept: CT IMAGING | Facility: CLINIC | Age: 54
DRG: 481 | End: 2024-12-21
Attending: PHYSICIAN ASSISTANT
Payer: COMMERCIAL

## 2024-12-21 ENCOUNTER — ANESTHESIA (OUTPATIENT)
Dept: SURGERY | Facility: CLINIC | Age: 54
DRG: 481 | End: 2024-12-21
Payer: COMMERCIAL

## 2024-12-21 ENCOUNTER — APPOINTMENT (OUTPATIENT)
Dept: RADIOLOGY | Facility: CLINIC | Age: 54
DRG: 481 | End: 2024-12-21
Attending: STUDENT IN AN ORGANIZED HEALTH CARE EDUCATION/TRAINING PROGRAM
Payer: COMMERCIAL

## 2024-12-21 ENCOUNTER — ANESTHESIA EVENT (OUTPATIENT)
Dept: SURGERY | Facility: CLINIC | Age: 54
DRG: 481 | End: 2024-12-21
Payer: COMMERCIAL

## 2024-12-21 ENCOUNTER — HOSPITAL ENCOUNTER (INPATIENT)
Facility: CLINIC | Age: 54
LOS: 2 days | Discharge: HOME | DRG: 481 | End: 2024-12-23
Attending: EMERGENCY MEDICINE | Admitting: FAMILY MEDICINE
Payer: COMMERCIAL

## 2024-12-21 DIAGNOSIS — R74.8 ELEVATED CK: ICD-10-CM

## 2024-12-21 DIAGNOSIS — K21.9 GASTROESOPHAGEAL REFLUX DISEASE WITHOUT ESOPHAGITIS: ICD-10-CM

## 2024-12-21 DIAGNOSIS — N17.9 AKI (ACUTE KIDNEY INJURY) (H): ICD-10-CM

## 2024-12-21 DIAGNOSIS — S72.143A: Primary | ICD-10-CM

## 2024-12-21 PROBLEM — G47.33 OSA (OBSTRUCTIVE SLEEP APNEA): Status: ACTIVE | Noted: 2024-12-21

## 2024-12-21 PROBLEM — E87.1 HYPONATREMIA: Status: ACTIVE | Noted: 2024-12-21

## 2024-12-21 LAB
ABO + RH BLD: NORMAL
ANION GAP SERPL CALCULATED.3IONS-SCNC: 18 MMOL/L (ref 7–15)
BLD GP AB SCN SERPL QL: NEGATIVE
BUN SERPL-MCNC: 17.7 MG/DL (ref 6–20)
CALCIUM SERPL-MCNC: 9.7 MG/DL (ref 8.8–10.4)
CHLORIDE SERPL-SCNC: 89 MMOL/L (ref 98–107)
CK SERPL-CCNC: 1849 U/L (ref 39–308)
CREAT SERPL-MCNC: 1.24 MG/DL (ref 0.67–1.17)
EGFRCR SERPLBLD CKD-EPI 2021: 70 ML/MIN/1.73M2
ERYTHROCYTE [DISTWIDTH] IN BLOOD BY AUTOMATED COUNT: ABNORMAL %
GLUCOSE SERPL-MCNC: 118 MG/DL (ref 70–99)
HCO3 SERPL-SCNC: 24 MMOL/L (ref 22–29)
HCT VFR BLD AUTO: ABNORMAL %
HGB BLD-MCNC: 12.8 G/DL (ref 13.3–17.7)
INR PPP: 1.16 (ref 0.85–1.15)
MCH RBC QN AUTO: ABNORMAL PG
MCHC RBC AUTO-ENTMCNC: ABNORMAL G/DL
MCV RBC AUTO: ABNORMAL FL
PLAT MORPH BLD: NORMAL
PLATELET # BLD AUTO: 105 10E3/UL (ref 150–450)
POTASSIUM SERPL-SCNC: 3.8 MMOL/L (ref 3.4–5.3)
RBC # BLD AUTO: ABNORMAL 10*6/UL
RBC MORPH BLD: NORMAL
SODIUM SERPL-SCNC: 131 MMOL/L (ref 135–145)
SPECIMEN EXP DATE BLD: NORMAL
TSH SERPL DL<=0.005 MIU/L-ACNC: 1.16 UIU/ML (ref 0.3–4.2)
WBC # BLD AUTO: 6.4 10E3/UL (ref 4–11)

## 2024-12-21 PROCEDURE — 84443 ASSAY THYROID STIM HORMONE: CPT | Performed by: FAMILY MEDICINE

## 2024-12-21 PROCEDURE — 999N000182 XR SURGERY CARM FLUORO GREATER THAN 5 MIN

## 2024-12-21 PROCEDURE — 250N000009 HC RX 250: Performed by: ANESTHESIOLOGY

## 2024-12-21 PROCEDURE — 370N000017 HC ANESTHESIA TECHNICAL FEE, PER MIN: Performed by: STUDENT IN AN ORGANIZED HEALTH CARE EDUCATION/TRAINING PROGRAM

## 2024-12-21 PROCEDURE — 250N000011 HC RX IP 250 OP 636: Performed by: PHYSICIAN ASSISTANT

## 2024-12-21 PROCEDURE — 85610 PROTHROMBIN TIME: CPT | Performed by: FAMILY MEDICINE

## 2024-12-21 PROCEDURE — 250N000013 HC RX MED GY IP 250 OP 250 PS 637: Performed by: STUDENT IN AN ORGANIZED HEALTH CARE EDUCATION/TRAINING PROGRAM

## 2024-12-21 PROCEDURE — 250N000013 HC RX MED GY IP 250 OP 250 PS 637: Performed by: FAMILY MEDICINE

## 2024-12-21 PROCEDURE — 250N000011 HC RX IP 250 OP 636: Mod: JZ | Performed by: STUDENT IN AN ORGANIZED HEALTH CARE EDUCATION/TRAINING PROGRAM

## 2024-12-21 PROCEDURE — 72125 CT NECK SPINE W/O DYE: CPT

## 2024-12-21 PROCEDURE — C1713 ANCHOR/SCREW BN/BN,TIS/BN: HCPCS | Performed by: STUDENT IN AN ORGANIZED HEALTH CARE EDUCATION/TRAINING PROGRAM

## 2024-12-21 PROCEDURE — 250N000009 HC RX 250: Performed by: STUDENT IN AN ORGANIZED HEALTH CARE EDUCATION/TRAINING PROGRAM

## 2024-12-21 PROCEDURE — 120N000001 HC R&B MED SURG/OB

## 2024-12-21 PROCEDURE — 36415 COLL VENOUS BLD VENIPUNCTURE: CPT | Performed by: FAMILY MEDICINE

## 2024-12-21 PROCEDURE — 80048 BASIC METABOLIC PNL TOTAL CA: CPT | Performed by: PHYSICIAN ASSISTANT

## 2024-12-21 PROCEDURE — 86900 BLOOD TYPING SEROLOGIC ABO: CPT | Performed by: FAMILY MEDICINE

## 2024-12-21 PROCEDURE — 99223 1ST HOSP IP/OBS HIGH 75: CPT | Performed by: FAMILY MEDICINE

## 2024-12-21 PROCEDURE — 85049 AUTOMATED PLATELET COUNT: CPT | Performed by: PHYSICIAN ASSISTANT

## 2024-12-21 PROCEDURE — 250N000011 HC RX IP 250 OP 636: Performed by: ANESTHESIOLOGY

## 2024-12-21 PROCEDURE — 96375 TX/PRO/DX INJ NEW DRUG ADDON: CPT

## 2024-12-21 PROCEDURE — P9045 ALBUMIN (HUMAN), 5%, 250 ML: HCPCS | Performed by: ANESTHESIOLOGY

## 2024-12-21 PROCEDURE — 710N000010 HC RECOVERY PHASE 1, LEVEL 2, PER MIN: Performed by: STUDENT IN AN ORGANIZED HEALTH CARE EDUCATION/TRAINING PROGRAM

## 2024-12-21 PROCEDURE — 70450 CT HEAD/BRAIN W/O DYE: CPT

## 2024-12-21 PROCEDURE — 85018 HEMOGLOBIN: CPT | Performed by: PHYSICIAN ASSISTANT

## 2024-12-21 PROCEDURE — 272N000001 HC OR GENERAL SUPPLY STERILE: Performed by: STUDENT IN AN ORGANIZED HEALTH CARE EDUCATION/TRAINING PROGRAM

## 2024-12-21 PROCEDURE — 96374 THER/PROPH/DIAG INJ IV PUSH: CPT

## 2024-12-21 PROCEDURE — 250N000025 HC SEVOFLURANE, PER MIN: Performed by: STUDENT IN AN ORGANIZED HEALTH CARE EDUCATION/TRAINING PROGRAM

## 2024-12-21 PROCEDURE — 250N000011 HC RX IP 250 OP 636: Performed by: FAMILY MEDICINE

## 2024-12-21 PROCEDURE — 99285 EMERGENCY DEPT VISIT HI MDM: CPT | Mod: 25

## 2024-12-21 PROCEDURE — 36415 COLL VENOUS BLD VENIPUNCTURE: CPT | Performed by: PHYSICIAN ASSISTANT

## 2024-12-21 PROCEDURE — C1769 GUIDE WIRE: HCPCS | Performed by: STUDENT IN AN ORGANIZED HEALTH CARE EDUCATION/TRAINING PROGRAM

## 2024-12-21 PROCEDURE — 360N000084 HC SURGERY LEVEL 4 W/ FLUORO, PER MIN: Performed by: STUDENT IN AN ORGANIZED HEALTH CARE EDUCATION/TRAINING PROGRAM

## 2024-12-21 PROCEDURE — 0QS636Z REPOSITION RIGHT UPPER FEMUR WITH INTRAMEDULLARY INTERNAL FIXATION DEVICE, PERCUTANEOUS APPROACH: ICD-10-PCS | Performed by: STUDENT IN AN ORGANIZED HEALTH CARE EDUCATION/TRAINING PROGRAM

## 2024-12-21 PROCEDURE — 82550 ASSAY OF CK (CPK): CPT | Performed by: PHYSICIAN ASSISTANT

## 2024-12-21 PROCEDURE — 258N000003 HC RX IP 258 OP 636: Performed by: PHYSICIAN ASSISTANT

## 2024-12-21 PROCEDURE — 73502 X-RAY EXAM HIP UNI 2-3 VIEWS: CPT

## 2024-12-21 PROCEDURE — 258N000003 HC RX IP 258 OP 636: Performed by: ANESTHESIOLOGY

## 2024-12-21 DEVICE — IMPLANTABLE DEVICE: Type: IMPLANTABLE DEVICE | Site: HIP | Status: FUNCTIONAL

## 2024-12-21 DEVICE — IMP SCR SYN TFNA FENESTRATED LAG 100MM 04.038.200S: Type: IMPLANTABLE DEVICE | Site: HIP | Status: FUNCTIONAL

## 2024-12-21 RX ORDER — FENTANYL CITRATE 50 UG/ML
50 INJECTION, SOLUTION INTRAMUSCULAR; INTRAVENOUS EVERY 5 MIN PRN
Status: CANCELLED | OUTPATIENT
Start: 2024-12-21

## 2024-12-21 RX ORDER — LISINOPRIL 20 MG/1
20 TABLET ORAL DAILY
Status: DISCONTINUED | OUTPATIENT
Start: 2024-12-22 | End: 2024-12-23 | Stop reason: HOSPADM

## 2024-12-21 RX ORDER — NALOXONE HYDROCHLORIDE 0.4 MG/ML
0.2 INJECTION, SOLUTION INTRAMUSCULAR; INTRAVENOUS; SUBCUTANEOUS
Status: DISCONTINUED | OUTPATIENT
Start: 2024-12-21 | End: 2024-12-23 | Stop reason: HOSPADM

## 2024-12-21 RX ORDER — ONDANSETRON 4 MG/1
4 TABLET, ORALLY DISINTEGRATING ORAL EVERY 6 HOURS PRN
Status: DISCONTINUED | OUTPATIENT
Start: 2024-12-21 | End: 2024-12-23 | Stop reason: HOSPADM

## 2024-12-21 RX ORDER — AMOXICILLIN 250 MG
1 CAPSULE ORAL 2 TIMES DAILY
Status: DISCONTINUED | OUTPATIENT
Start: 2024-12-21 | End: 2024-12-23 | Stop reason: HOSPADM

## 2024-12-21 RX ORDER — ONDANSETRON 2 MG/ML
4 INJECTION INTRAMUSCULAR; INTRAVENOUS ONCE
Status: COMPLETED | OUTPATIENT
Start: 2024-12-21 | End: 2024-12-21

## 2024-12-21 RX ORDER — SODIUM CHLORIDE, SODIUM LACTATE, POTASSIUM CHLORIDE, CALCIUM CHLORIDE 600; 310; 30; 20 MG/100ML; MG/100ML; MG/100ML; MG/100ML
INJECTION, SOLUTION INTRAVENOUS CONTINUOUS
Status: CANCELLED | OUTPATIENT
Start: 2024-12-21

## 2024-12-21 RX ORDER — HYDROMORPHONE HCL IN WATER/PF 6 MG/30 ML
0.2 PATIENT CONTROLLED ANALGESIA SYRINGE INTRAVENOUS EVERY 5 MIN PRN
Status: CANCELLED | OUTPATIENT
Start: 2024-12-21

## 2024-12-21 RX ORDER — LIDOCAINE 40 MG/G
CREAM TOPICAL
Status: CANCELLED | OUTPATIENT
Start: 2024-12-21

## 2024-12-21 RX ORDER — HYDROMORPHONE HCL IN WATER/PF 6 MG/30 ML
0.4 PATIENT CONTROLLED ANALGESIA SYRINGE INTRAVENOUS
Status: DISCONTINUED | OUTPATIENT
Start: 2024-12-21 | End: 2024-12-23 | Stop reason: HOSPADM

## 2024-12-21 RX ORDER — ONDANSETRON 4 MG/1
4 TABLET, ORALLY DISINTEGRATING ORAL EVERY 6 HOURS PRN
Status: DISCONTINUED | OUTPATIENT
Start: 2024-12-21 | End: 2024-12-22

## 2024-12-21 RX ORDER — ONDANSETRON 4 MG/1
4 TABLET, ORALLY DISINTEGRATING ORAL EVERY 30 MIN PRN
Status: CANCELLED | OUTPATIENT
Start: 2024-12-21

## 2024-12-21 RX ORDER — ONDANSETRON 2 MG/ML
4 INJECTION INTRAMUSCULAR; INTRAVENOUS EVERY 6 HOURS PRN
Status: DISCONTINUED | OUTPATIENT
Start: 2024-12-21 | End: 2024-12-22

## 2024-12-21 RX ORDER — MEPERIDINE HYDROCHLORIDE 50 MG/ML
12.5 INJECTION INTRAMUSCULAR; INTRAVENOUS; SUBCUTANEOUS EVERY 5 MIN PRN
Status: CANCELLED | OUTPATIENT
Start: 2024-12-21

## 2024-12-21 RX ORDER — CEFAZOLIN SODIUM 2 G/100ML
2 INJECTION, SOLUTION INTRAVENOUS EVERY 8 HOURS
Status: COMPLETED | OUTPATIENT
Start: 2024-12-22 | End: 2024-12-22

## 2024-12-21 RX ORDER — FENTANYL CITRATE 50 UG/ML
INJECTION, SOLUTION INTRAMUSCULAR; INTRAVENOUS PRN
Status: DISCONTINUED | OUTPATIENT
Start: 2024-12-21 | End: 2024-12-21

## 2024-12-21 RX ORDER — ACETAMINOPHEN 325 MG/1
975 TABLET ORAL ONCE
Status: CANCELLED | OUTPATIENT
Start: 2024-12-21 | End: 2024-12-21

## 2024-12-21 RX ORDER — LIDOCAINE 40 MG/G
CREAM TOPICAL
Status: DISCONTINUED | OUTPATIENT
Start: 2024-12-21 | End: 2024-12-22

## 2024-12-21 RX ORDER — SODIUM CHLORIDE, SODIUM LACTATE, POTASSIUM CHLORIDE, CALCIUM CHLORIDE 600; 310; 30; 20 MG/100ML; MG/100ML; MG/100ML; MG/100ML
INJECTION, SOLUTION INTRAVENOUS CONTINUOUS PRN
Status: DISCONTINUED | OUTPATIENT
Start: 2024-12-21 | End: 2024-12-21

## 2024-12-21 RX ORDER — LIDOCAINE HYDROCHLORIDE 10 MG/ML
INJECTION, SOLUTION INFILTRATION; PERINEURAL PRN
Status: DISCONTINUED | OUTPATIENT
Start: 2024-12-21 | End: 2024-12-21

## 2024-12-21 RX ORDER — CEFAZOLIN SODIUM 2 G/100ML
2 INJECTION, SOLUTION INTRAVENOUS
Status: COMPLETED | OUTPATIENT
Start: 2024-12-21 | End: 2024-12-21

## 2024-12-21 RX ORDER — ONDANSETRON 2 MG/ML
4 INJECTION INTRAMUSCULAR; INTRAVENOUS EVERY 6 HOURS PRN
Status: DISCONTINUED | OUTPATIENT
Start: 2024-12-21 | End: 2024-12-23 | Stop reason: HOSPADM

## 2024-12-21 RX ORDER — ONDANSETRON 2 MG/ML
4 INJECTION INTRAMUSCULAR; INTRAVENOUS EVERY 30 MIN PRN
Status: CANCELLED | OUTPATIENT
Start: 2024-12-21

## 2024-12-21 RX ORDER — METOPROLOL SUCCINATE 25 MG/1
25 TABLET, EXTENDED RELEASE ORAL EVERY MORNING
Status: DISCONTINUED | OUTPATIENT
Start: 2024-12-22 | End: 2024-12-23 | Stop reason: HOSPADM

## 2024-12-21 RX ORDER — TRANEXAMIC ACID 650 MG/1
1950 TABLET ORAL ONCE
Status: CANCELLED | OUTPATIENT
Start: 2024-12-21 | End: 2024-12-21

## 2024-12-21 RX ORDER — NALOXONE HYDROCHLORIDE 0.4 MG/ML
0.1 INJECTION, SOLUTION INTRAMUSCULAR; INTRAVENOUS; SUBCUTANEOUS
Status: CANCELLED | OUTPATIENT
Start: 2024-12-21

## 2024-12-21 RX ORDER — METOPROLOL SUCCINATE 25 MG/1
25 TABLET, EXTENDED RELEASE ORAL EVERY MORNING
Status: ON HOLD | COMMUNITY
End: 2024-12-23

## 2024-12-21 RX ORDER — LIDOCAINE 40 MG/G
CREAM TOPICAL
Status: DISCONTINUED | OUTPATIENT
Start: 2024-12-21 | End: 2024-12-23 | Stop reason: HOSPADM

## 2024-12-21 RX ORDER — ONDANSETRON 2 MG/ML
INJECTION INTRAMUSCULAR; INTRAVENOUS PRN
Status: DISCONTINUED | OUTPATIENT
Start: 2024-12-21 | End: 2024-12-21

## 2024-12-21 RX ORDER — VASOPRESSIN IN 0.9 % NACL 2 UNIT/2ML
SYRINGE (ML) INTRAVENOUS PRN
Status: DISCONTINUED | OUTPATIENT
Start: 2024-12-21 | End: 2024-12-21

## 2024-12-21 RX ORDER — HYDROMORPHONE HCL IN WATER/PF 6 MG/30 ML
0.4 PATIENT CONTROLLED ANALGESIA SYRINGE INTRAVENOUS EVERY 5 MIN PRN
Status: CANCELLED | OUTPATIENT
Start: 2024-12-21

## 2024-12-21 RX ORDER — CALCIUM CARBONATE 500 MG/1
1000 TABLET, CHEWABLE ORAL 4 TIMES DAILY PRN
Status: DISCONTINUED | OUTPATIENT
Start: 2024-12-21 | End: 2024-12-23 | Stop reason: HOSPADM

## 2024-12-21 RX ORDER — PROCHLORPERAZINE MALEATE 10 MG
10 TABLET ORAL EVERY 6 HOURS PRN
Status: DISCONTINUED | OUTPATIENT
Start: 2024-12-21 | End: 2024-12-23 | Stop reason: HOSPADM

## 2024-12-21 RX ORDER — POLYETHYLENE GLYCOL 3350 17 G/17G
17 POWDER, FOR SOLUTION ORAL DAILY
Status: DISCONTINUED | OUTPATIENT
Start: 2024-12-22 | End: 2024-12-23 | Stop reason: HOSPADM

## 2024-12-21 RX ORDER — HYDROMORPHONE HYDROCHLORIDE 2 MG/1
2 TABLET ORAL EVERY 4 HOURS PRN
Status: DISCONTINUED | OUTPATIENT
Start: 2024-12-21 | End: 2024-12-23 | Stop reason: HOSPADM

## 2024-12-21 RX ORDER — NALOXONE HYDROCHLORIDE 0.4 MG/ML
0.4 INJECTION, SOLUTION INTRAMUSCULAR; INTRAVENOUS; SUBCUTANEOUS
Status: DISCONTINUED | OUTPATIENT
Start: 2024-12-21 | End: 2024-12-23 | Stop reason: HOSPADM

## 2024-12-21 RX ORDER — FENTANYL CITRATE 50 UG/ML
25 INJECTION, SOLUTION INTRAMUSCULAR; INTRAVENOUS EVERY 5 MIN PRN
Status: CANCELLED | OUTPATIENT
Start: 2024-12-21

## 2024-12-21 RX ORDER — DEXAMETHASONE SODIUM PHOSPHATE 4 MG/ML
4 INJECTION, SOLUTION INTRA-ARTICULAR; INTRALESIONAL; INTRAMUSCULAR; INTRAVENOUS; SOFT TISSUE
Status: CANCELLED | OUTPATIENT
Start: 2024-12-21

## 2024-12-21 RX ORDER — ACETAMINOPHEN 325 MG/1
975 TABLET ORAL EVERY 8 HOURS
Status: DISCONTINUED | OUTPATIENT
Start: 2024-12-21 | End: 2024-12-23 | Stop reason: HOSPADM

## 2024-12-21 RX ORDER — OXYCODONE HYDROCHLORIDE 5 MG/1
10 TABLET ORAL EVERY 4 HOURS PRN
Status: DISCONTINUED | OUTPATIENT
Start: 2024-12-21 | End: 2024-12-23 | Stop reason: HOSPADM

## 2024-12-21 RX ORDER — FAMOTIDINE 20 MG/1
20 TABLET, FILM COATED ORAL 2 TIMES DAILY
Status: DISCONTINUED | OUTPATIENT
Start: 2024-12-21 | End: 2024-12-23 | Stop reason: HOSPADM

## 2024-12-21 RX ORDER — SODIUM CHLORIDE AND POTASSIUM CHLORIDE 150; 900 MG/100ML; MG/100ML
INJECTION, SOLUTION INTRAVENOUS CONTINUOUS
Status: DISCONTINUED | OUTPATIENT
Start: 2024-12-21 | End: 2024-12-22

## 2024-12-21 RX ORDER — HYDROMORPHONE HYDROCHLORIDE 1 MG/ML
0.5 INJECTION, SOLUTION INTRAMUSCULAR; INTRAVENOUS; SUBCUTANEOUS ONCE
Status: COMPLETED | OUTPATIENT
Start: 2024-12-21 | End: 2024-12-21

## 2024-12-21 RX ORDER — AMOXICILLIN 250 MG
1 CAPSULE ORAL 2 TIMES DAILY PRN
Status: DISCONTINUED | OUTPATIENT
Start: 2024-12-21 | End: 2024-12-23 | Stop reason: HOSPADM

## 2024-12-21 RX ORDER — AMOXICILLIN 250 MG
2 CAPSULE ORAL 2 TIMES DAILY PRN
Status: DISCONTINUED | OUTPATIENT
Start: 2024-12-21 | End: 2024-12-23 | Stop reason: HOSPADM

## 2024-12-21 RX ORDER — HYDROMORPHONE HYDROCHLORIDE 4 MG/1
4 TABLET ORAL EVERY 4 HOURS PRN
Status: DISCONTINUED | OUTPATIENT
Start: 2024-12-21 | End: 2024-12-23 | Stop reason: HOSPADM

## 2024-12-21 RX ORDER — BISACODYL 10 MG
10 SUPPOSITORY, RECTAL RECTAL DAILY PRN
Status: DISCONTINUED | OUTPATIENT
Start: 2024-12-24 | End: 2024-12-23 | Stop reason: HOSPADM

## 2024-12-21 RX ORDER — HALOPERIDOL 5 MG/ML
1 INJECTION INTRAMUSCULAR
Status: CANCELLED | OUTPATIENT
Start: 2024-12-21

## 2024-12-21 RX ORDER — OXYCODONE HYDROCHLORIDE 5 MG/1
5 TABLET ORAL EVERY 4 HOURS PRN
Status: DISCONTINUED | OUTPATIENT
Start: 2024-12-21 | End: 2024-12-23 | Stop reason: HOSPADM

## 2024-12-21 RX ORDER — ASPIRIN 81 MG/1
81 TABLET ORAL 2 TIMES DAILY
Status: DISCONTINUED | OUTPATIENT
Start: 2024-12-21 | End: 2024-12-23 | Stop reason: HOSPADM

## 2024-12-21 RX ORDER — POLYETHYLENE GLYCOL 3350 17 G/17G
17 POWDER, FOR SOLUTION ORAL 2 TIMES DAILY PRN
Status: DISCONTINUED | OUTPATIENT
Start: 2024-12-21 | End: 2024-12-23 | Stop reason: HOSPADM

## 2024-12-21 RX ORDER — MAGNESIUM HYDROXIDE 1200 MG/15ML
LIQUID ORAL PRN
Status: DISCONTINUED | OUTPATIENT
Start: 2024-12-21 | End: 2024-12-21 | Stop reason: HOSPADM

## 2024-12-21 RX ORDER — HYDROMORPHONE HCL IN WATER/PF 6 MG/30 ML
0.2 PATIENT CONTROLLED ANALGESIA SYRINGE INTRAVENOUS
Status: DISCONTINUED | OUTPATIENT
Start: 2024-12-21 | End: 2024-12-23 | Stop reason: HOSPADM

## 2024-12-21 RX ORDER — DEXAMETHASONE SODIUM PHOSPHATE 10 MG/ML
INJECTION, SOLUTION INTRAMUSCULAR; INTRAVENOUS PRN
Status: DISCONTINUED | OUTPATIENT
Start: 2024-12-21 | End: 2024-12-21

## 2024-12-21 RX ORDER — PROPOFOL 10 MG/ML
INJECTION, EMULSION INTRAVENOUS CONTINUOUS PRN
Status: DISCONTINUED | OUTPATIENT
Start: 2024-12-21 | End: 2024-12-21

## 2024-12-21 RX ORDER — PROCHLORPERAZINE MALEATE 10 MG
10 TABLET ORAL EVERY 6 HOURS PRN
Status: DISCONTINUED | OUTPATIENT
Start: 2024-12-21 | End: 2024-12-22

## 2024-12-21 RX ORDER — PROPOFOL 10 MG/ML
INJECTION, EMULSION INTRAVENOUS PRN
Status: DISCONTINUED | OUTPATIENT
Start: 2024-12-21 | End: 2024-12-21

## 2024-12-21 RX ORDER — CEFAZOLIN SODIUM 2 G/100ML
2 INJECTION, SOLUTION INTRAVENOUS SEE ADMIN INSTRUCTIONS
Status: CANCELLED | OUTPATIENT
Start: 2024-12-21

## 2024-12-21 RX ORDER — ACETAMINOPHEN 325 MG/1
975 TABLET ORAL EVERY 8 HOURS
Status: DISCONTINUED | OUTPATIENT
Start: 2024-12-21 | End: 2024-12-21

## 2024-12-21 RX ADMIN — PHENYLEPHRINE HYDROCHLORIDE 100 MCG: 10 INJECTION INTRAVENOUS at 16:05

## 2024-12-21 RX ADMIN — PROPOFOL 200 MG: 10 INJECTION, EMULSION INTRAVENOUS at 16:02

## 2024-12-21 RX ADMIN — SODIUM CHLORIDE, POTASSIUM CHLORIDE, SODIUM LACTATE AND CALCIUM CHLORIDE: 600; 310; 30; 20 INJECTION, SOLUTION INTRAVENOUS at 16:01

## 2024-12-21 RX ADMIN — DEXMEDETOMIDINE HYDROCHLORIDE 8 MCG: 100 INJECTION, SOLUTION INTRAVENOUS at 16:57

## 2024-12-21 RX ADMIN — LIDOCAINE HYDROCHLORIDE 5 ML: 10 INJECTION, SOLUTION INFILTRATION; PERINEURAL at 16:02

## 2024-12-21 RX ADMIN — ONDANSETRON 4 MG: 2 INJECTION INTRAMUSCULAR; INTRAVENOUS at 17:34

## 2024-12-21 RX ADMIN — Medication 1 UNITS: at 17:34

## 2024-12-21 RX ADMIN — TRANEXAMIC ACID 1 G: 1 INJECTION, SOLUTION INTRAVENOUS at 16:06

## 2024-12-21 RX ADMIN — ROCURONIUM BROMIDE 20 MG: 10 INJECTION, SOLUTION INTRAVENOUS at 16:57

## 2024-12-21 RX ADMIN — Medication 1 UNITS: at 16:21

## 2024-12-21 RX ADMIN — ROCURONIUM BROMIDE 50 MG: 10 INJECTION, SOLUTION INTRAVENOUS at 16:25

## 2024-12-21 RX ADMIN — TRANEXAMIC ACID 1 G: 1 INJECTION, SOLUTION INTRAVENOUS at 17:43

## 2024-12-21 RX ADMIN — ASPIRIN 81 MG: 81 TABLET, COATED ORAL at 20:52

## 2024-12-21 RX ADMIN — Medication 1 UNITS: at 17:19

## 2024-12-21 RX ADMIN — SENNOSIDES AND DOCUSATE SODIUM 1 TABLET: 50; 8.6 TABLET ORAL at 20:51

## 2024-12-21 RX ADMIN — SODIUM CHLORIDE 1000 ML: 9 INJECTION, SOLUTION INTRAVENOUS at 13:49

## 2024-12-21 RX ADMIN — Medication 200 MG: at 17:55

## 2024-12-21 RX ADMIN — ONDANSETRON 4 MG: 2 INJECTION, SOLUTION INTRAMUSCULAR; INTRAVENOUS at 12:18

## 2024-12-21 RX ADMIN — Medication 100 MG: at 16:02

## 2024-12-21 RX ADMIN — Medication 1 UNITS: at 16:22

## 2024-12-21 RX ADMIN — Medication 1 UNITS: at 16:31

## 2024-12-21 RX ADMIN — DEXMEDETOMIDINE HYDROCHLORIDE 8 MCG: 100 INJECTION, SOLUTION INTRAVENOUS at 18:04

## 2024-12-21 RX ADMIN — DEXAMETHASONE SODIUM PHOSPHATE 10 MG: 10 INJECTION, SOLUTION INTRAMUSCULAR; INTRAVENOUS at 16:04

## 2024-12-21 RX ADMIN — FENTANYL CITRATE 100 MCG: 50 INJECTION INTRAMUSCULAR; INTRAVENOUS at 16:01

## 2024-12-21 RX ADMIN — HYDROMORPHONE HYDROCHLORIDE 4 MG: 4 TABLET ORAL at 20:52

## 2024-12-21 RX ADMIN — HYDROMORPHONE HYDROCHLORIDE 0.4 MG: 0.2 INJECTION, SOLUTION INTRAMUSCULAR; INTRAVENOUS; SUBCUTANEOUS at 19:34

## 2024-12-21 RX ADMIN — HYDROMORPHONE HYDROCHLORIDE 0.5 MG: 1 INJECTION, SOLUTION INTRAMUSCULAR; INTRAVENOUS; SUBCUTANEOUS at 14:15

## 2024-12-21 RX ADMIN — DEXMEDETOMIDINE HYDROCHLORIDE 8 MCG: 100 INJECTION, SOLUTION INTRAVENOUS at 16:45

## 2024-12-21 RX ADMIN — ALBUMIN (HUMAN): 12.5 SOLUTION INTRAVENOUS at 17:10

## 2024-12-21 RX ADMIN — FAMOTIDINE 20 MG: 20 TABLET, FILM COATED ORAL at 20:52

## 2024-12-21 RX ADMIN — ALBUMIN (HUMAN): 12.5 SOLUTION INTRAVENOUS at 16:19

## 2024-12-21 RX ADMIN — HYDROMORPHONE HYDROCHLORIDE 1 MG: 1 INJECTION, SOLUTION INTRAMUSCULAR; INTRAVENOUS; SUBCUTANEOUS at 12:18

## 2024-12-21 RX ADMIN — Medication 1 UNITS: at 16:39

## 2024-12-21 RX ADMIN — ACETAMINOPHEN 975 MG: 325 TABLET ORAL at 22:39

## 2024-12-21 RX ADMIN — SODIUM CHLORIDE, POTASSIUM CHLORIDE, SODIUM LACTATE AND CALCIUM CHLORIDE: 600; 310; 30; 20 INJECTION, SOLUTION INTRAVENOUS at 17:49

## 2024-12-21 RX ADMIN — PROPOFOL 50 MCG/KG/MIN: 10 INJECTION, EMULSION INTRAVENOUS at 16:15

## 2024-12-21 RX ADMIN — PHENYLEPHRINE HYDROCHLORIDE 0.5 MCG/KG/MIN: 10 INJECTION INTRAVENOUS at 16:05

## 2024-12-21 RX ADMIN — MIDAZOLAM 2 MG: 1 INJECTION INTRAMUSCULAR; INTRAVENOUS at 16:01

## 2024-12-21 RX ADMIN — HYDROMORPHONE HYDROCHLORIDE 0.5 MG: 1 INJECTION, SOLUTION INTRAMUSCULAR; INTRAVENOUS; SUBCUTANEOUS at 16:39

## 2024-12-21 RX ADMIN — CEFAZOLIN SODIUM 2 G: 2 INJECTION, SOLUTION INTRAVENOUS at 16:01

## 2024-12-21 RX ADMIN — POTASSIUM CHLORIDE AND SODIUM CHLORIDE: 900; 150 INJECTION, SOLUTION INTRAVENOUS at 19:39

## 2024-12-21 ASSESSMENT — LIFESTYLE VARIABLES
AGITATION: NORMAL ACTIVITY
HEADACHE, FULLNESS IN HEAD: NOT PRESENT
ANXIETY: MILDLY ANXIOUS
NAUSEA AND VOMITING: NO NAUSEA AND NO VOMITING
TOTAL SCORE: 5
ORIENTATION AND CLOUDING OF SENSORIUM: ORIENTED AND CAN DO SERIAL ADDITIONS
TREMOR: 2
PAROXYSMAL SWEATS: 2
VISUAL DISTURBANCES: NOT PRESENT
AUDITORY DISTURBANCES: NOT PRESENT

## 2024-12-21 ASSESSMENT — ACTIVITIES OF DAILY LIVING (ADL)
ADLS_ACUITY_SCORE: 41
ADLS_ACUITY_SCORE: 41
ADLS_ACUITY_SCORE: 32
ADLS_ACUITY_SCORE: 41
ADLS_ACUITY_SCORE: 41
ADLS_ACUITY_SCORE: 32
ADLS_ACUITY_SCORE: 41

## 2024-12-21 NOTE — ANESTHESIA PROCEDURE NOTES
Airway       Patient location during procedure: OR       Procedure Start/Stop Times: 12/21/2024 4:03 PM  Staff -        CRNA: Trice Arguello APRN CRNA       Performed By: CRNAIndications and Patient Condition       Indications for airway management: erica-procedural       Induction type:RSI       Mask difficulty assessment: 0 - not attempted    Final Airway Details       Final airway type: endotracheal airway       Successful airway: ETT - single  Endotracheal Airway Details        ETT size (mm): 8.0       Cuffed: yes       Cuff volume (mL): 8       Successful intubation technique: video laryngoscopy       VL Blade Size: Glidescope 4       Grade View of Cords: 1       Adjucts: stylet       Position: Right       Measured from: lips       Secured at (cm): 24       Bite block used: None    Post intubation assessment        Placement verified by: capnometry, equal breath sounds and chest rise        Number of attempts at approach: 1       Number of other approaches attempted: 0       Secured with: tape       Ease of procedure: easy       Dentition: Intact and Unchanged    Medication(s) Administered   Medication Administration Time: 12/21/2024 4:03 PM

## 2024-12-21 NOTE — ED NOTES
Report and care given to ALEXA Gordon    Will change patient into a clean gown and do kirill wipes.

## 2024-12-21 NOTE — CONSULTS
ORTHOPEDIC CONSULTATION    Consultation  Richard J Bloch Jr.,  1970, MRN [id number]    Elevated CK [R74.8]  KAMALA (acute kidney injury) (H) [N17.9]  Closed displaced intertrochanteric fracture of femur, unspecified laterality, initial encounter (H) [S72.870A]    PCP: Naye Faye, [phone]   Code status:  Full Code       Extended Emergency Contact Information  Primary Emergency Contact: Leah Bloch  Mobile Phone: [phone]  Relation: Spouse         IMPRESSION:  Right intertrochanteric femur fracture     PLAN:  I discussed the above findings with the patient and his wife. Given displacement and patient general age and health, recommend R hip CMN. Discussed risks and benefits of surgery including bleeding, infection, damage to nerves and vessels, non union, mal union, painful hardware. Patient agrees to proceed. Right side marked, consent signed.         CHIEF COMPLAINT: Closed displaced intertrochanteric fracture of femur, unspecified laterality, initial encounter (H)    HISTORY OF PRESENT ILLNESS:  Richard Bloch is a 53 yo M with right hip injury after a fall last night. Lives at home with wife and kids. Unable to ambulate due to pain. Presented to ED and found to have a right hip fracture.       ALLERGIES:   Review of patient's allergies indicates   Allergies   Allergen Reactions    Amlodipine Swelling     Leg edema         MEDICATIONS UPON ADMISSION:  Medications were reviewed.  They include:   Medications Prior to Admission   Medication Sig Dispense Refill Last Dose/Taking    lisinopril-hydrochlorothiazide (ZESTORETIC) 20-25 MG tablet TAKE 1 TABLET BY MOUTH EVERY DAY (1 MON INS) Strength: 20-25 mg 90 tablet 3 2024 Morning    metoprolol succinate ER (TOPROL XL) 25 MG 24 hr tablet Take 25 mg by mouth every morning.   2024 Morning    omeprazole (PRILOSEC) 20 MG DR capsule TAKE 1 CAPSULE BY MOUTH EVERY DAY. 90 capsule 2 2024 Morning    sildenafil (REVATIO) 20 MG tablet Take 3-4 tabs  as directed before sexual activity 90 tablet 3 Unknown         SOCIAL HISTORY:   he  reports that he has never smoked. He has never been exposed to tobacco smoke. He has never used smokeless tobacco. He reports current alcohol use. He reports that he does not currently use drugs.      FAMILY HISTORY:  family history is not on file.      REVIEW OF SYSTEMS:   Reviewed with patient. See HPI, otherwise negative       PHYSICAL EXAMINATION:  Vitals: Temp:  [98.8  F (37.1  C)] 98.8  F (37.1  C)  Pulse:  [85-89] 85  Resp:  [16] 16  BP: (109-115)/(71-78) 109/71  SpO2:  [94 %-100 %] 94 %  General: On examination, the patient is resting comfortably, NAD, awake, and alert and oriented to person, place, time, and, and general circumstances   SKIN: There is bruising and swelling of the right leg  Pulses:  dorsalis pedis and posterior tibial pulse is intact and equal bilaterally  Sensation: intact and equal bilaterally to the distal lower extremities.  Tenderness: over the right hip  ROM: Pain with any right hip motion  Motor: Able to flex and extend toes.   Contralateral side= Full range of motion, Negative joint instability findings, 5/5 motor groups about the joint, Non-tender.       RADIOGRAPHIC EVALUATION:  Personally reviewed Right femur xrays showing a comminuted intertrochanteric femur fracture           Ruben López MD, MD  [facility]  Date: 12/21/2024  Time: 4:01 PM    CC1:   Justin Gonzalez MD    CC2:   Naye aFye

## 2024-12-21 NOTE — ED PROVIDER NOTES
EMERGENCY DEPARTMENT ENCOUNTER      NAME: Richard J Bloch Jr.  AGE: 54 year old male  YOB: 1970  MRN: [id number]  EVALUATION DATE & TIME: 2024 11:38 AM    PCP: Naye Faye    ED PROVIDER: Samira Millan PA-C      Chief Complaint   Patient presents with    Leg Injury     R leg         FINAL IMPRESSION:  1. Closed displaced intertrochanteric fracture of femur, unspecified laterality, initial encounter (H)    2. Elevated CK    3. KAMALA (acute kidney injury) (H)          ED COURSE & MEDICAL DECISION MAKIN:43 AM I introduced myself to patient, performed initial HPI and examination.   11:53 AM Staffed with Dr. Quan  1:19 PM CK 1800. IV fluids ordered   1:29 PM X-ray with + hip fracture, paged for hospitalist and orthopedics  1:39 PM Updated patient on results  1:42 PM Spoke with hospitalist who accepts admission. Surgeon Dr. López   2:37 PM CT head and neck without acute traumatic findings     54 year old male with PMH HTN, GERD presents to the Emergency Department for evaluation of hip injury.    Patient fell down 8 stairs last night. Mechanical fall. Denies hitting head, no LOC. Pain in right hip, unable to bear weight. Injury occurred at 0100, attempted to wait it out. Crawled and had son help him into bed. Upon realizing he still could not walk on it today, called EMS for evaluation.    VSS  On exam patient has right leg externally rotated and flexed, unable to appreciate leg length discrepancy. Does have tenderness throughout right hip. Distal CMS intact.   Exam otherwise unremarkable.    Labs obtained with concern for prolonged immobilization, CK 1800s. Creatinine mildly elevated from baseline (1.24). Patient started on IV fluids.    X-ray obtained, comminuted intertrochanteric fracture of right femur with proximal displacement distal fracture fragment, mild displacement of greater and lesser tuberosity fracture fragments. Orthopedics aware, plan for OR intervention.  CT head  and c-spine unremarkable.    Plan to admit, OR intervention, continued hydration.           Medical Decision Making  Obtained supplemental history:Supplemental history obtained?: Documented in chart  Reviewed external records: External records reviewed?: No  Care impacted by chronic illness:Documented in Chart  Care significantly affected by social determinants of health:N/A  Did you consider but not order tests?: Work up considered but not performed and documented in chart, if applicable  Did you interpret images independently?: Independent interpretation of ECG and images noted in documentation, when applicable.  Consultation discussion with other provider:Did you involve another provider (consultant, , pharmacy, etc.)?: I discussed the care with another health care provider, see documentation for details.  Admit.  Not Applicable        MEDICATIONS GIVEN IN THE EMERGENCY:  Medications   famotidine (PEPCID) tablet 20 mg ( Oral Automatically Held 12/24/24 2000)   metoprolol succinate ER (TOPROL XL) 24 hr tablet 25 mg ( Oral Automatically Held 12/25/24 0800)   lisinopril (ZESTRIL) tablet 20 mg ( Oral Automatically Held 12/25/24 0800)   lidocaine 1 % 0.1-1 mL ( Other Auto Hold 12/21/24 1558)   lidocaine (LMX4) cream ( Topical Auto Hold 12/21/24 1558)   sodium chloride (PF) 0.9% PF flush 3 mL ( Intracatheter Automatically Held 12/24/24 2330)   sodium chloride (PF) 0.9% PF flush 3 mL ( Intracatheter Auto Hold 12/21/24 1558)   senna-docusate (SENOKOT-S/PERICOLACE) 8.6-50 MG per tablet 1 tablet ( Oral Auto Hold 12/21/24 1558)     Or   senna-docusate (SENOKOT-S/PERICOLACE) 8.6-50 MG per tablet 2 tablet ( Oral Auto Hold 12/21/24 1558)   calcium carbonate (TUMS) chewable tablet 1,000 mg ( Oral Auto Hold 12/21/24 1558)   HOLD: ALL Anticoagulant medications until AFTER surgery (has no administration in time range)   acetaminophen (TYLENOL) tablet 975 mg ( Oral Automatically Held 12/23/24 0730)   0.9% sodium chloride + KCl 20  mEq/L infusion (has no administration in time range)   polyethylene glycol (MIRALAX) Packet 17 g ( Oral Auto Hold 12/21/24 1558)   ondansetron (ZOFRAN ODT) ODT tab 4 mg ( Oral Auto Hold 12/21/24 1558)     Or   ondansetron (ZOFRAN) injection 4 mg ( Intravenous Auto Hold 12/21/24 1558)   prochlorperazine (COMPAZINE) injection 10 mg ( Intravenous Auto Hold 12/21/24 1558)     Or   prochlorperazine (COMPAZINE) tablet 10 mg ( Oral Auto Hold 12/21/24 1558)   HYDROmorphone (DILAUDID) injection 0.2 mg ( Intravenous Auto Hold 12/21/24 1558)     Or   HYDROmorphone (DILAUDID) injection 0.4 mg ( Intravenous Auto Hold 12/21/24 1558)   HYDROmorphone (DILAUDID) tablet 2 mg ( Oral Auto Hold 12/21/24 1558)     Or   HYDROmorphone (DILAUDID) tablet 4 mg ( Oral Auto Hold 12/21/24 1558)   HYDROmorphone (DILAUDID) injection 1 mg (1 mg Intravenous $Given 12/21/24 1218)   ondansetron (ZOFRAN) injection 4 mg (4 mg Intravenous $Given 12/21/24 1218)   sodium chloride 0.9% BOLUS 1,000 mL ( Intravenous Rate/Dose Verify 12/21/24 1433)   HYDROmorphone (PF) (DILAUDID) injection 0.5 mg (0.5 mg Intravenous $Given 12/21/24 1415)       NEW PRESCRIPTIONS STARTED AT TODAY'S ER VISIT  Current Discharge Medication List             =================================================================    HPI    Patient information was obtained from: Patient    Use of : N/A         Jose J Bloch Jr. is a 54 year old male with a pertinent history of HTN, GERD who presents to this ED by EMS for evaluation of fall, hip pain.     Patient fell down 8 stairs at home at 1 or 2 am. Was carrying things down the stairs.   Pain in right hip, has not been able to ambulate or bear weight on that hip since the injury. Tried to crawl up the stairs and ultimately son came home and helped him get up to bed.     Denies hitting his head. No LOC. No neck or back pain. Arms WNL.     Reports some alcohol consumption during the wild game last night, did not feel  intoxicated. Does not report regular alcohol consumption.     Not on blood thinning medicine.    Here with a friend. Reports significant other is not aware he is here at the moment as she would have panicked.       REVIEW OF SYSTEMS   ROS negative unless otherwise stated in HPI    PAST MEDICAL HISTORY:  Past Medical History:   Diagnosis Date    Vasquez's esophagus     Gastroesophageal reflux disease without esophagitis 09/21/2016    Hepatic steatosis 2018    Not on ultrasound    Hypertriglyceridemia     Obstructive sleep apnea syndrome        PAST SURGICAL HISTORY:  Past Surgical History:   Procedure Laterality Date    EGD      TONSILLECTOMY & ADENOIDECTOMY         CURRENT MEDICATIONS:    No current outpatient medications on file.      ALLERGIES:  Allergies   Allergen Reactions    Amlodipine Swelling     Leg edema       FAMILY HISTORY:  No family history on file.    SOCIAL HISTORY:   Social History     Socioeconomic History    Marital status:    Tobacco Use    Smoking status: Never     Passive exposure: Never    Smokeless tobacco: Never   Vaping Use    Vaping status: Never Used   Substance and Sexual Activity    Alcohol use: Yes     Comment: 5-10 friday, saturday, no sundays.    Drug use: Not Currently   Social History Narrative    , 2 children     Social Drivers of Health     Financial Resource Strain: Low Risk  (4/2/2024)    Financial Resource Strain     Within the past 12 months, have you or your family members you live with been unable to get utilities (heat, electricity) when it was really needed?: No   Food Insecurity: Low Risk  (4/2/2024)    Food Insecurity     Within the past 12 months, did you worry that your food would run out before you got money to buy more?: No     Within the past 12 months, did the food you bought just not last and you didn t have money to get more?: No   Transportation Needs: Low Risk  (4/2/2024)    Transportation Needs     Within the past 12 months, has lack of  "transportation kept you from medical appointments, getting your medicines, non-medical meetings or appointments, work, or from getting things that you need?: No   Physical Activity: Sufficiently Active (4/2/2024)    Exercise Vital Sign     Days of Exercise per Week: 5 days     Minutes of Exercise per Session: 40 min   Stress: Stress Concern Present (4/2/2024)    [employer] - Occupational Stress Questionnaire     Feeling of Stress : To some extent   Social Connections: Unknown (4/2/2024)    Social Connection and Isolation Panel [NHANES]     Frequency of Social Gatherings with Friends and Family: Three times a week   Interpersonal Safety: Low Risk  (4/2/2024)    Interpersonal Safety     Do you feel physically and emotionally safe where you currently live?: Yes     Within the past 12 months, have you been hit, slapped, kicked or otherwise physically hurt by someone?: No     Within the past 12 months, have you been humiliated or emotionally abused in other ways by your partner or ex-partner?: No   Housing Stability: Low Risk  (4/2/2024)    Housing Stability     Do you have housing? : Yes     Are you worried about losing your housing?: No       VITALS:  /71   Pulse 85   Temp 98.8  F (37.1  C)   Resp 16   Ht 1.778 m (5' 10\")   Wt 74.8 kg (165 lb)   SpO2 94%   BMI 23.68 kg/m      PHYSICAL EXAM    Constitutional: Well developed, Well nourished, NAD, GCS 15   HENT: Normocephalic, Atraumatic, Oropharynx clear.   Neck- Supple, Nontender. Normal ROM. No stridor.  Eyes: Conjunctiva normal. PERRL. EOM intact. No nystagmus or photophobia  Respiratory: No respiratory distress, speaking in full sentences. Normal breath sounds  Cardiovascular: Normal heart rate, Regular rhythm, No murmurs. Dorsalis pedis and posterior tibialis pulses intact   GI: Soft, nontender  Musculoskeletal: Right hip held abducted on the bed, knee flexed. Tenderness to right hip. Extremities otherwise nontender. No " thoracic or lumbar spine tenderness.   Integument: Warm, Dry, No erythema, ecchymosis, or rash.  Neurologic: Alert & oriented x 3, Normal sensory function. No focal deficits  Psychiatric: Affect normal, Judgment normal, Mood normal. Cooperative.      LAB:  All pertinent labs reviewed and interpreted.  Results for orders placed or performed during the hospital encounter of 12/21/24   Head CT w/o contrast    Impression    IMPRESSION:  HEAD CT:  1.  No CT evidence for acute intracranial process.  2.  Brain atrophy and presumed chronic microvascular ischemic changes as above.    CERVICAL SPINE CT:  1.  No CT evidence for acute fracture or post traumatic subluxation.  2.  Mild cervical spondylosis greatest at C5-C6 as above.   CT Cervical Spine w/o Contrast    Impression    IMPRESSION:  HEAD CT:  1.  No CT evidence for acute intracranial process.  2.  Brain atrophy and presumed chronic microvascular ischemic changes as above.    CERVICAL SPINE CT:  1.  No CT evidence for acute fracture or post traumatic subluxation.  2.  Mild cervical spondylosis greatest at C5-C6 as above.   XR Pelvis and Hip Right 2 Views    Impression    IMPRESSION: Comminuted intertrochanteric fracture of the right femur with proximal displacement of the distal fracture fragment and mild displacement of the greater and lesser tuberosity fracture fragments.   CBC (+ platelets, no diff)   Result Value Ref Range    WBC Count 6.4 4.0 - 11.0 10e3/uL    RBC Count      Hemoglobin 12.8 (L) 13.3 - 17.7 g/dL    Hematocrit      MCV      MCH      MCHC      RDW      Platelet Count 105 (L) 150 - 450 10e3/uL   Basic metabolic panel   Result Value Ref Range    Sodium 131 (L) 135 - 145 mmol/L    Potassium 3.8 3.4 - 5.3 mmol/L    Chloride 89 (L) 98 - 107 mmol/L    Carbon Dioxide (CO2) 24 22 - 29 mmol/L    Anion Gap 18 (H) 7 - 15 mmol/L    Urea Nitrogen 17.7 6.0 - 20.0 mg/dL    Creatinine 1.24 (H) 0.67 - 1.17 mg/dL    GFR Estimate 70 >60 mL/min/1.73m2    Calcium 9.7  8.8 - 10.4 mg/dL    Glucose 118 (H) 70 - 99 mg/dL   Result Value Ref Range    CK 1,849 (HH) 39 - 308 U/L   RBC and Platelet Morphology   Result Value Ref Range    RBC Morphology Confirmed RBC Indices     Platelet Assessment  Automated Count Confirmed. Platelet morphology is normal.     Automated Count Confirmed. Platelet morphology is normal.   TSH with free T4 reflex   Result Value Ref Range    TSH 1.16 0.30 - 4.20 uIU/mL       RADIOLOGY:  Reviewed all pertinent imaging. Please see official radiology report.  XR Pelvis and Hip Right 2 Views   Final Result   IMPRESSION: Comminuted intertrochanteric fracture of the right femur with proximal displacement of the distal fracture fragment and mild displacement of the greater and lesser tuberosity fracture fragments.      CT Cervical Spine w/o Contrast   Final Result   IMPRESSION:   HEAD CT:   1.  No CT evidence for acute intracranial process.   2.  Brain atrophy and presumed chronic microvascular ischemic changes as above.      CERVICAL SPINE CT:   1.  No CT evidence for acute fracture or post traumatic subluxation.   2.  Mild cervical spondylosis greatest at C5-C6 as above.      Head CT w/o contrast   Final Result   IMPRESSION:   HEAD CT:   1.  No CT evidence for acute intracranial process.   2.  Brain atrophy and presumed chronic microvascular ischemic changes as above.      CERVICAL SPINE CT:   1.  No CT evidence for acute fracture or post traumatic subluxation.   2.  Mild cervical spondylosis greatest at C5-C6 as above.      XR Surgery ORTEGA  Fluoro G/T 5 Min    (Results Pending)       EKG:    None    PROCEDURES:   None        Samira Millan PA-C  Emergency Medicine  [facility] EMERGENCY ROOM  [address] [facility] [address] [phone]             Samira Millan PA-C  12/21/24 4752

## 2024-12-21 NOTE — ED NOTES
I am seeing this patient along with ALLISON Wen. I had a face to face encounter with this patient seen by the Advanced Practice Provider (ANGELIQUE).  I have seen, examined, and discussed the patient with the ANGELIQUE and agree with their assessment and plan of management. I personally saw the patient and performed a substantive portion of the visit including all aspects of the medical decision making.    HPI: 54-year-old male who had a fall yesterday and is complaining of leg pain today.  He states that he has not been able to walk on it but had been crawling around the house    Physical Exam: Nontoxic with a shortened tender right hip.  Neurovascularly intact      LABS  Pertinent lab results reviewed in chart.  Labs Ordered and Resulted from Time of ED Arrival to Time of ED Departure   BASIC METABOLIC PANEL - Abnormal       Result Value    Sodium 131 (*)     Potassium 3.8      Chloride 89 (*)     Carbon Dioxide (CO2) 24      Anion Gap 18 (*)     Urea Nitrogen 17.7      Creatinine 1.24 (*)     GFR Estimate 70      Calcium 9.7      Glucose 118 (*)    CK TOTAL - Abnormal    CK 1,849 (*)    CBC WITH PLATELETS   RBC AND PLATELET MORPHOLOGY       EKG      RADIOLOGY  XR Pelvis and Hip Right 2 Views   Final Result   IMPRESSION: Comminuted intertrochanteric fracture of the right femur with proximal displacement of the distal fracture fragment and mild displacement of the greater and lesser tuberosity fracture fragments.      Head CT w/o contrast    (Results Pending)   CT Cervical Spine w/o Contrast    (Results Pending)       PROCEDURES       ED COURSE & MEDICAL DECISION MAKING    Pertinent Labs and Imagaing reviewed (see chart for details)    54 year old male here for evaluation of a fall yesterday.  The circumstances sound somewhat questionable and is not certain if he hit his head after falling down the stairs, so we did also order a CT of the head and cervical spine.  X-ray of the hip does show an intertrochanteric hip  fracture.  Preoperative labs ordered as it sounds like he was crawling around on the ground and he does have a CK that is elevated to 1800 and a very mild acute kidney injury.  We will start some IV fluids for this.  Case to be discussed with orthopedics and hospitalist for admission.    At the conclusion of the encounter I discussed  the results of all of the tests and the disposition.   The questions were answered.  The patient or family acknowledged understanding and was agreeable with the care plan.       FINAL IMPRESSION      1. Closed displaced intertrochanteric fracture of femur, unspecified laterality, initial encounter (H)    2. Elevated CK    3. KAMALA (acute kidney injury) (H)            CRITICAL CARE  0 Minutes    Angela Quan MD  12/21/2024 1:25 PM       Angela Quan MD  12/21/24 0010

## 2024-12-21 NOTE — H&P
[facility] MEDICINE ADMISSION HISTORY AND PHYSICAL   Date of Admission: 12/21/2024  Richard J Bloch Jr., 1970, [id number]    Assessment and Plan:   Richard J Bloch Jr. is a 54 year old male with PMH signficant for MARIA TERESA on CPAP, Vasquez's esophagus/GERD with hiatal hernia, HTN, hypertriglyceridemia, hepatic steatosis and alcohol overuse.  12/21/2024 early a.m. fell down stairs having right leg pain.  Did not come to the ED until 1130 hrs. Sodium 131, creatinine 1.24, CK 1849.  X-ray showed right femoral fracture with displacement.  CT scan head and neck unremarkable.  [facility] Dr. López consulted.      Fall  Acute rhabdomyolysis  Right comminuted intertrochanteric fracture of right femur  Inpatient MedSur admission.  Expect length of stay greater than 2 nights.  [facility] consulted.  Discussed case with Dr. López.  Planning to take to the OR this evening.  NPO.  Hydrate with IV fluids.  Scheduled Tylenol and as needed narcotics.  Admission CK 1849.  Follow CK daily.  Low surgical risk.    KAMALA  Admission creatinine 1.24 secondary to his prolonged time down.  IV fluids as above.  Follow daily BMP.    Hyponatremia  Admission sodium 131.  Similar to prior baseline.  May be secondary to his alcohol use.  Check a TSH.  Placed on fluid restriction 1800 mL per 24 hours.  IV fluids as above.  Follow sodium level.    MARIA TERESA  Utilize home CPAP.  Follow pulse oximetry per standard protocols.    GERD with hiatal hernia  History of Vasquez's esophagus  Transition his omeprazole to Pepcid due to the hyponatremia.    Essential hypertension  Order metoprolol and lisinopril with hold parameters.  Did receive both doses this morning.  Hold his hydrochlorothiazide due to the hyponatremia.    Hepatic steatosis  Noted in the past.    Alcohol overuse  Patient admits to drinking 5-10 shots of vodka on Fridays and Saturdays.  Denies history of alcohol dependency treatment, DUI nor ever using  intravenous drugs.  No indication for CIWA protocols at this time but follow clinically.  Critical importance of avoiding alcohol while taking pain medication discussed with the patient.    Anticoagulation   SCDs to left leg only.      FoleyNot present    Code Status: Full Code    Disposition: Inpatient   Medically Ready for Discharge: Anticipated in 2-4 Days     Lines/Drains/Airways       PIV Line  Duration             Peripheral IV 12/21/24 Anterior;Distal;Left Upper arm <1 day    Peripheral IV 12/21/24 Left;Dorsal Hand <1 day                    Clinically Significant Risk Factors Present on Admission         # Hyponatremia: Lowest Na = 131 mmol/L in last 2 days, will monitor as appropriate  # Hypochloremia: Lowest Cl = 89 mmol/L in last 2 days, will monitor as appropriate        # Thrombocytopenia: Lowest platelets = 105 in last 2 days, will monitor for bleeding   # Hypertension: Noted on problem list                      Chief Complaint Right leg pain after fall     HISTORY     Richard J Bloch Jr. is a 54 year old male with PMH of MARIA TERESA on CPAP, Vasquez's esophagus with hiatal hernia, HTN, hypertriglyceridemia, hepatic steatosis and alcohol overuse.  12/21/2024 early a.m. fell down stairs having right leg pain.  Did not come to the ED until 1130 hrs. Sodium 131, creatinine 1.24, CK 1849.  X-ray showed right femoral fracture with displacement.  CT scan head and neck unremarkable.  [facility] Dr. López consulted..  Patient admits to having 5-10 drinks with vodka and it on Fridays and Saturday nights.  Denies intoxication.  Has never been through treatment, had a DUI or ever used intravenous drugs.  Does not use THC or smoke..  Denies chest pain shortness of breath nausea or vomiting.  Normal sensation and function at the ankles bilaterally.  Does use CPAP for his sleep apnea.  States his Vasquez's esophagus was treated.  Denies personal history of heart attack, stroke, cancer, diabetes, DVT, PE or peptic  ulcer disease.  Questions answered to verbalize satisfaction.    Past Medical History     Past Medical History:  No date: Vasquez's esophagus  09/21/2016: Gastroesophageal reflux disease without esophagitis  2018: Hepatic steatosis      Comment:  Not on ultrasound  No date: Hypertriglyceridemia  No date: Obstructive sleep apnea syndrome     Patient Active Problem List    Diagnosis Date Noted    Closed displaced intertrochanteric fracture of femur, unspecified laterality, initial encounter (H) 12/21/2024     Priority: Medium    Elevated CK 12/21/2024     Priority: Medium    KAMALA (acute kidney injury) (H) 12/21/2024     Priority: Medium    MARIA TERESA (obstructive sleep apnea) 12/21/2024     Priority: Medium    Hyponatremia 12/21/2024     Priority: Medium    Essential hypertension with goal blood pressure less than 140/90 09/21/2016     Priority: Medium    Gastroesophageal reflux disease without esophagitis 09/21/2016     Priority: Medium    Hypertriglyceridemia 09/21/2016     Priority: Medium     Surgical History     Past Surgical History:   Procedure Laterality Date    EGD      TONSILLECTOMY & ADENOIDECTOMY       Family History    No family history on file.   Social History      Social History     Tobacco Use    Smoking status: Never     Passive exposure: Never    Smokeless tobacco: Never   Vaping Use    Vaping status: Never Used   Substance Use Topics    Alcohol use: Yes     Comment: 5-10 friday, saturday, no sundays.    Drug use: Not Currently      Allergies     Allergies   Allergen Reactions    Amlodipine Swelling     Leg edema     Prior to Admission Medications      Prior to Admission Medications   Prescriptions Last Dose Informant Patient Reported? Taking?   lisinopril-hydrochlorothiazide (ZESTORETIC) 20-25 MG tablet 12/21/2024 Morning  No Yes   Sig: TAKE 1 TABLET BY MOUTH EVERY DAY (1 MON INS) Strength: 20-25 mg   metoprolol succinate ER (TOPROL XL) 25 MG 24 hr tablet 12/21/2024 Morning  Yes Yes   Sig: Take 25 mg by  mouth every morning.   omeprazole (PRILOSEC) 20 MG DR capsule 12/21/2024 Morning  No Yes   Sig: TAKE 1 CAPSULE BY MOUTH EVERY DAY.   sildenafil (REVATIO) 20 MG tablet Unknown  No Yes   Sig: Take 3-4 tabs as directed before sexual activity      Facility-Administered Medications: None      Review of Systems     A 12 point comprehensive review of systems was negative except as noted above in HPI.    PHYSICAL EXAMINATION     Vitals      Temp:  [98.8  F (37.1  C)] 98.8  F (37.1  C)  Pulse:  [85-89] 85  Resp:  [16] 16  BP: (109-115)/(71-78) 109/71  SpO2:  [94 %-100 %] 94 %    Examination     Constitutional: awake, alert, cooperative, no apparent distress, and appears stated age  Eyes: Lids and lashes normal, pupils equal, round and reactive to light, extra ocular muscles intact, sclera clear, conjunctiva normal  ENT: Normocephalic, without obvious abnormality, atraumatic, sinuses nontender on palpation, external ears without lesions, oral pharynx with moist mucous membranes, tonsils without erythema or exudates, gums normal and good dentition.  Hematologic / Lymphatic: no cervical lymphadenopathy and no supraclavicular lymphadenopathy  Respiratory: No increased work of breathing, good air exchange, clear to auscultation bilaterally, no crackles or wheezing  Cardiovascular: Normal apical impulse, regular rate and rhythm, normal S1 and S2, no S3 or S4, and no murmur noted  GI: No scars, normal bowel sounds, soft, non-distended, non-tender, no masses palpated, no hepatosplenomegally  Skin: normal skin color, texture, turgor, no redness, warmth, or swelling, and no rashes  Musculoskeletal: Limited exam due to right leg fracture.  Limb length shortening noted on the right side.  Good upper extremity function bilaterally.  Normal ankle function and sensation with good pulses bilaterally. no lower extremity pitting edema present  Neurologic: Cranial nerves II-XII are grossly intact. Sensory:  Sensory intact Deep Tendon Reflexes:  Not performed due to fracture  Neuropsychiatric: General: normal, calm, and normal eye contact Level of consciousness: alert / normal Affect: normal Orientation: oriented to self, place, time and situation Memory and insight: normal, memory for past and recent events intact, and thought process normal      Pertinent Lab     Results for orders placed or performed during the hospital encounter of 12/21/24 (from the past 24 hours)   CBC (+ platelets, no diff)   Result Value Ref Range    WBC Count 6.4 4.0 - 11.0 10e3/uL    RBC Count      Hemoglobin 12.8 (L) 13.3 - 17.7 g/dL    Hematocrit      MCV      MCH      MCHC      RDW      Platelet Count 105 (L) 150 - 450 10e3/uL   Basic metabolic panel   Result Value Ref Range    Sodium 131 (L) 135 - 145 mmol/L    Potassium 3.8 3.4 - 5.3 mmol/L    Chloride 89 (L) 98 - 107 mmol/L    Carbon Dioxide (CO2) 24 22 - 29 mmol/L    Anion Gap 18 (H) 7 - 15 mmol/L    Urea Nitrogen 17.7 6.0 - 20.0 mg/dL    Creatinine 1.24 (H) 0.67 - 1.17 mg/dL    GFR Estimate 70 >60 mL/min/1.73m2    Calcium 9.7 8.8 - 10.4 mg/dL    Glucose 118 (H) 70 - 99 mg/dL   CK total   Result Value Ref Range    CK 1,849 (HH) 39 - 308 U/L   RBC and Platelet Morphology   Result Value Ref Range    RBC Morphology Confirmed RBC Indices     Platelet Assessment  Automated Count Confirmed. Platelet morphology is normal.     Automated Count Confirmed. Platelet morphology is normal.   Head CT w/o contrast    Narrative    EXAM: CT HEAD W/O CONTRAST, CT CERVICAL SPINE W/O CONTRAST  LOCATION: [facility]  DATE: 12/21/2024    INDICATION: Fall fall down stairs. Femoral fracture.  COMPARISON: CT head 3/27/2018  TECHNIQUE:   1) Routine CT Head without IV contrast. Multiplanar reformats. Dose reduction techniques were used.  2) Routine CT Cervical Spine without IV contrast. Multiplanar reformats. Dose reduction techniques were used.    FINDINGS:   HEAD CT:   INTRACRANIAL CONTENTS: No intracranial hemorrhage,  extraaxial collection, or mass effect.  No CT evidence of acute infarct. Mild presumed chronic small vessel ischemic changes. Mild generalized volume loss. No hydrocephalus.     VISUALIZED ORBITS/SINUSES/MASTOIDS: No intraorbital abnormality. No paranasal sinus mucosal disease. No middle ear or mastoid effusion.    BONES/SOFT TISSUES: No scalp hematoma. No skull fracture.    CERVICAL SPINE CT:   VERTEBRA: Mildly straightened cervical lordosis and 2 mm degenerative type retrolisthesis at C5-6. Preserved vertebral body heights. No fracture or posttraumatic subluxation.     CANAL/FORAMINA: Mild cervical spondylosis, with loss of disc height and endplate/uncovertebral spurring greatest at C5-6 and C6-7. At C5-6, mild spinal canal stenosis with moderate to severe right and mild to moderate left neural foraminal stenosis. At   C6-7, mild bilateral neural foraminal stenosis.    PARASPINAL: Prevertebral soft tissues within normal limits for thickness. Visualized lung fields are clear.      Impression    IMPRESSION:  HEAD CT:  1.  No CT evidence for acute intracranial process.  2.  Brain atrophy and presumed chronic microvascular ischemic changes as above.    CERVICAL SPINE CT:  1.  No CT evidence for acute fracture or post traumatic subluxation.  2.  Mild cervical spondylosis greatest at C5-C6 as above.   CT Cervical Spine w/o Contrast    Narrative    EXAM: CT HEAD W/O CONTRAST, CT CERVICAL SPINE W/O CONTRAST  LOCATION: [facility]  DATE: 12/21/2024    INDICATION: Fall fall down stairs. Femoral fracture.  COMPARISON: CT head 3/27/2018  TECHNIQUE:   1) Routine CT Head without IV contrast. Multiplanar reformats. Dose reduction techniques were used.  2) Routine CT Cervical Spine without IV contrast. Multiplanar reformats. Dose reduction techniques were used.    FINDINGS:   HEAD CT:   INTRACRANIAL CONTENTS: No intracranial hemorrhage, extraaxial collection, or mass effect.  No CT evidence of acute  infarct. Mild presumed chronic small vessel ischemic changes. Mild generalized volume loss. No hydrocephalus.     VISUALIZED ORBITS/SINUSES/MASTOIDS: No intraorbital abnormality. No paranasal sinus mucosal disease. No middle ear or mastoid effusion.    BONES/SOFT TISSUES: No scalp hematoma. No skull fracture.    CERVICAL SPINE CT:   VERTEBRA: Mildly straightened cervical lordosis and 2 mm degenerative type retrolisthesis at C5-6. Preserved vertebral body heights. No fracture or posttraumatic subluxation.     CANAL/FORAMINA: Mild cervical spondylosis, with loss of disc height and endplate/uncovertebral spurring greatest at C5-6 and C6-7. At C5-6, mild spinal canal stenosis with moderate to severe right and mild to moderate left neural foraminal stenosis. At   C6-7, mild bilateral neural foraminal stenosis.    PARASPINAL: Prevertebral soft tissues within normal limits for thickness. Visualized lung fields are clear.      Impression    IMPRESSION:  HEAD CT:  1.  No CT evidence for acute intracranial process.  2.  Brain atrophy and presumed chronic microvascular ischemic changes as above.    CERVICAL SPINE CT:  1.  No CT evidence for acute fracture or post traumatic subluxation.  2.  Mild cervical spondylosis greatest at C5-C6 as above.   XR Pelvis and Hip Right 2 Views    Narrative    EXAM: XR PELVIS AND HIP RIGHT 2 VIEWS  LOCATION: [facility]  DATE: 12/21/2024    INDICATION: Pain, fall  COMPARISON: None.      Impression    IMPRESSION: Comminuted intertrochanteric fracture of the right femur with proximal displacement of the distal fracture fragment and mild displacement of the greater and lesser tuberosity fracture fragments.       Pertinent Radiology     Radiology Results:   Recent Results (from the past 24 hours)   Head CT w/o contrast    Narrative    EXAM: CT HEAD W/O CONTRAST, CT CERVICAL SPINE W/O CONTRAST  LOCATION: [facility]  DATE: 12/21/2024    INDICATION:  Fall fall down stairs. Femoral fracture.  COMPARISON: CT head 3/27/2018  TECHNIQUE:   1) Routine CT Head without IV contrast. Multiplanar reformats. Dose reduction techniques were used.  2) Routine CT Cervical Spine without IV contrast. Multiplanar reformats. Dose reduction techniques were used.    FINDINGS:   HEAD CT:   INTRACRANIAL CONTENTS: No intracranial hemorrhage, extraaxial collection, or mass effect.  No CT evidence of acute infarct. Mild presumed chronic small vessel ischemic changes. Mild generalized volume loss. No hydrocephalus.     VISUALIZED ORBITS/SINUSES/MASTOIDS: No intraorbital abnormality. No paranasal sinus mucosal disease. No middle ear or mastoid effusion.    BONES/SOFT TISSUES: No scalp hematoma. No skull fracture.    CERVICAL SPINE CT:   VERTEBRA: Mildly straightened cervical lordosis and 2 mm degenerative type retrolisthesis at C5-6. Preserved vertebral body heights. No fracture or posttraumatic subluxation.     CANAL/FORAMINA: Mild cervical spondylosis, with loss of disc height and endplate/uncovertebral spurring greatest at C5-6 and C6-7. At C5-6, mild spinal canal stenosis with moderate to severe right and mild to moderate left neural foraminal stenosis. At   C6-7, mild bilateral neural foraminal stenosis.    PARASPINAL: Prevertebral soft tissues within normal limits for thickness. Visualized lung fields are clear.      Impression    IMPRESSION:  HEAD CT:  1.  No CT evidence for acute intracranial process.  2.  Brain atrophy and presumed chronic microvascular ischemic changes as above.    CERVICAL SPINE CT:  1.  No CT evidence for acute fracture or post traumatic subluxation.  2.  Mild cervical spondylosis greatest at C5-C6 as above.   CT Cervical Spine w/o Contrast    Narrative    EXAM: CT HEAD W/O CONTRAST, CT CERVICAL SPINE W/O CONTRAST  LOCATION: [facility]  DATE: 12/21/2024    INDICATION: Fall fall down stairs. Femoral fracture.  COMPARISON: CT head  3/27/2018  TECHNIQUE:   1) Routine CT Head without IV contrast. Multiplanar reformats. Dose reduction techniques were used.  2) Routine CT Cervical Spine without IV contrast. Multiplanar reformats. Dose reduction techniques were used.    FINDINGS:   HEAD CT:   INTRACRANIAL CONTENTS: No intracranial hemorrhage, extraaxial collection, or mass effect.  No CT evidence of acute infarct. Mild presumed chronic small vessel ischemic changes. Mild generalized volume loss. No hydrocephalus.     VISUALIZED ORBITS/SINUSES/MASTOIDS: No intraorbital abnormality. No paranasal sinus mucosal disease. No middle ear or mastoid effusion.    BONES/SOFT TISSUES: No scalp hematoma. No skull fracture.    CERVICAL SPINE CT:   VERTEBRA: Mildly straightened cervical lordosis and 2 mm degenerative type retrolisthesis at C5-6. Preserved vertebral body heights. No fracture or posttraumatic subluxation.     CANAL/FORAMINA: Mild cervical spondylosis, with loss of disc height and endplate/uncovertebral spurring greatest at C5-6 and C6-7. At C5-6, mild spinal canal stenosis with moderate to severe right and mild to moderate left neural foraminal stenosis. At   C6-7, mild bilateral neural foraminal stenosis.    PARASPINAL: Prevertebral soft tissues within normal limits for thickness. Visualized lung fields are clear.      Impression    IMPRESSION:  HEAD CT:  1.  No CT evidence for acute intracranial process.  2.  Brain atrophy and presumed chronic microvascular ischemic changes as above.    CERVICAL SPINE CT:  1.  No CT evidence for acute fracture or post traumatic subluxation.  2.  Mild cervical spondylosis greatest at C5-C6 as above.   XR Pelvis and Hip Right 2 Views    Narrative    EXAM: XR PELVIS AND HIP RIGHT 2 VIEWS  LOCATION: [facility]  DATE: 12/21/2024    INDICATION: Pain, fall  COMPARISON: None.      Impression    IMPRESSION: Comminuted intertrochanteric fracture of the right femur with proximal displacement of the  distal fracture fragment and mild displacement of the greater and lesser tuberosity fracture fragments.               Justin Gonzalez MD  [facility]   Phone: #[phone]

## 2024-12-21 NOTE — ED NOTES
Bed: WWED-09  Expected date: 12/21/24  Expected time: 11:28 AM  Means of arrival:   Comments:  EMS- fall, hip pain

## 2024-12-21 NOTE — ANESTHESIA PREPROCEDURE EVALUATION
Anesthesia Pre-Procedure Evaluation    Patient: Richard J Bloch Jr.   MRN: [id number] : 1970        Procedure : Procedure(s):  INTERNAL FIXATION, FRACTURE, TROCHANTERIC, HIP, USING PINS OR RODS          Past Medical History:   Diagnosis Date    Vasquez's esophagus     Gastroesophageal reflux disease without esophagitis 2016    Hepatic steatosis 2018    Not on ultrasound    Hypertriglyceridemia     Obstructive sleep apnea syndrome       Past Surgical History:   Procedure Laterality Date    EGD      TONSILLECTOMY & ADENOIDECTOMY        Allergies   Allergen Reactions    Amlodipine Swelling     Leg edema      Social History     Tobacco Use    Smoking status: Never     Passive exposure: Never    Smokeless tobacco: Never   Substance Use Topics    Alcohol use: Yes     Comment: 5-10 friday, saturday, no sundays.      Wt Readings from Last 1 Encounters:   24 74.8 kg (165 lb)        Anesthesia Evaluation   Pt has had prior anesthetic.     No history of anesthetic complications       ROS/MED HX  ENT/Pulmonary:  - neg pulmonary ROS     Neurologic:    (-) no CVA   Cardiovascular:     (+) Dyslipidemia hypertension- -   -  - -                                      METS/Exercise Tolerance:     Hematologic: Comments: Thrombocytopenia, plts range       Musculoskeletal:  - neg musculoskeletal ROS     GI/Hepatic:     (+) GERD, Asymptomatic on medication,           liver disease (Fatty liver disease),       Renal/Genitourinary:       Endo:  - neg endo ROS     Psychiatric/Substance Use:     (+)   alcohol abuse      Infectious Disease:  - neg infectious disease ROS     Malignancy:  - neg malignancy ROS     Other:  - neg other ROS          Physical Exam    Airway  airway exam normal      Mallampati: II   TM distance: > 3 FB   Neck ROM: full   Mouth opening: > 3 cm    Respiratory Devices and Support         Dental  no notable dental history     (+) Minor Abnormalities - some fillings, tiny chips      Cardiovascular   " cardiovascular exam normal       Rhythm and rate: regular and normal     Pulmonary   pulmonary exam normal        breath sounds clear to auscultation           OUTSIDE LABS:  CBC:   Lab Results   Component Value Date    WBC 6.4 12/21/2024    WBC 4.1 04/02/2024    HGB 12.8 (L) 12/21/2024    HGB 13.8 04/02/2024    HCT  12/21/2024      Comment:      Unable to assay due to interfering substance.    HCT  04/02/2024      Comment:      Unable to assay due to interfering substance.     (L) 12/21/2024    PLT 84 (L) 04/02/2024     BMP:   Lab Results   Component Value Date     (L) 12/21/2024     (L) 04/02/2024    POTASSIUM 3.8 12/21/2024    POTASSIUM 4.0 04/02/2024    CHLORIDE 89 (L) 12/21/2024    CHLORIDE 90 (L) 04/02/2024    CO2 24 12/21/2024    CO2 29 04/02/2024    BUN 17.7 12/21/2024    BUN 16.1 04/02/2024    CR 1.24 (H) 12/21/2024    CR 1.17 04/02/2024     (H) 12/21/2024     (H) 04/02/2024     COAGS: No results found for: \"PTT\", \"INR\", \"FIBR\"  POC: No results found for: \"BGM\", \"HCG\", \"HCGS\"  HEPATIC:   Lab Results   Component Value Date    ALBUMIN 4.5 04/02/2024    PROTTOTAL 7.1 04/02/2024    ALT 59 04/02/2024    AST 97 (H) 04/02/2024    ALKPHOS 93 04/02/2024    BILITOTAL 1.1 04/02/2024     OTHER:   Lab Results   Component Value Date    MADDY 9.7 12/21/2024    LIPASE 21 03/27/2018    TSH 2.01 03/27/2018       Anesthesia Plan    ASA Status:  3, emergent    NPO Status:  NPO Appropriate    Anesthesia Type: General.     - Airway: ETT   Induction: Intravenous, RSI.   Maintenance: Balanced.        Consents    Anesthesia Plan(s) and associated risks, benefits, and realistic alternatives discussed. Questions answered and patient/representative(s) expressed understanding.     - Discussed:     - Discussed with:  Patient            Postoperative Care    Pain management: IV analgesics, Oral pain medications, Multi-modal analgesia.   PONV prophylaxis: Ondansetron (or other 5HT-3), Dexamethasone or " Solumedrol, Droperidol or Haldol     Comments:               Frank Meza MD    I have reviewed the pertinent notes and labs in the chart from the past 30 days and (re)examined the patient.  Any updates or changes from those notes are reflected in this note.     # Hyponatremia: Lowest Na = 131 mmol/L in last 2 days, will monitor as appropriate  # Hypochloremia: Lowest Cl = 89 mmol/L in last 2 days, will monitor as appropriate        # Thrombocytopenia: Lowest platelets = 105 in last 2 days, will monitor for bleeding   # Hypertension: Noted on problem list

## 2024-12-21 NOTE — PHARMACY-ADMISSION MEDICATION HISTORY
Pharmacist Admission Medication History    Admission medication history is complete. The information provided in this note is only as accurate as the sources available at the time of the update.    Information Source(s): Patient and CareEverywhere/SureScripts via in-person    Pertinent Information: took maintenance meds this morning     Changes made to PTA medication list:  Added: None  Deleted: None  Changed: metoprolol HS --> qAM     Allergies reviewed with patient and updates made in EHR: yes    Medication History Completed By: Daphnie White RPH 12/21/2024 1:59 PM    PTA Med List   Medication Sig Last Dose/Taking    lisinopril-hydrochlorothiazide (ZESTORETIC) 20-25 MG tablet TAKE 1 TABLET BY MOUTH EVERY DAY (1 MON INS) Strength: 20-25 mg 12/21/2024 Morning    metoprolol succinate ER (TOPROL XL) 25 MG 24 hr tablet Take 25 mg by mouth every morning. 12/21/2024 Morning    omeprazole (PRILOSEC) 20 MG DR capsule TAKE 1 CAPSULE BY MOUTH EVERY DAY. 12/21/2024 Morning    sildenafil (REVATIO) 20 MG tablet Take 3-4 tabs as directed before sexual activity Unknown

## 2024-12-21 NOTE — ED TRIAGE NOTES
Triage note put in after cares started.    53 yo male fell at home approximately 0130 this morning.  Fell down 8-9 steps.  Had been drinking etoh, while watching the [place] game.  Did not feel like he was overly intoxicated.  Had things in his arms and it was dark.  Fell onto the right hip.  Was sitting on the stairs and his son  carried him up to his room when he crawled int the bed.  Wife is currently at tennis and was informed before IV was inserted.     Triage Assessment (Adult)       Row Name 12/21/24 1342          Skin Circulation/Temperature WDL    Skin Circulation/Temperature WDL WDL        Cardiac WDL    Cardiac WDL WDL        Cognitive/Neuro/Behavioral WDL    Cognitive/Neuro/Behavioral WDL WDL

## 2024-12-22 ENCOUNTER — APPOINTMENT (OUTPATIENT)
Dept: OCCUPATIONAL THERAPY | Facility: CLINIC | Age: 54
DRG: 481 | End: 2024-12-22
Attending: STUDENT IN AN ORGANIZED HEALTH CARE EDUCATION/TRAINING PROGRAM
Payer: COMMERCIAL

## 2024-12-22 ENCOUNTER — APPOINTMENT (OUTPATIENT)
Dept: PHYSICAL THERAPY | Facility: CLINIC | Age: 54
DRG: 481 | End: 2024-12-22
Attending: STUDENT IN AN ORGANIZED HEALTH CARE EDUCATION/TRAINING PROGRAM
Payer: COMMERCIAL

## 2024-12-22 LAB
ANION GAP SERPL CALCULATED.3IONS-SCNC: 13 MMOL/L (ref 7–15)
BUN SERPL-MCNC: 20.1 MG/DL (ref 6–20)
CALCIUM SERPL-MCNC: 8.5 MG/DL (ref 8.8–10.4)
CHLORIDE SERPL-SCNC: 93 MMOL/L (ref 98–107)
CK SERPL-CCNC: 1194 U/L (ref 39–308)
CREAT SERPL-MCNC: 1.02 MG/DL (ref 0.67–1.17)
EGFRCR SERPLBLD CKD-EPI 2021: 88 ML/MIN/1.73M2
GLUCOSE SERPL-MCNC: 138 MG/DL (ref 70–99)
HCO3 SERPL-SCNC: 23 MMOL/L (ref 22–29)
HGB BLD-MCNC: 9.1 G/DL (ref 13.3–17.7)
POTASSIUM SERPL-SCNC: 4.4 MMOL/L (ref 3.4–5.3)
SODIUM SERPL-SCNC: 129 MMOL/L (ref 135–145)

## 2024-12-22 PROCEDURE — 250N000013 HC RX MED GY IP 250 OP 250 PS 637: Performed by: STUDENT IN AN ORGANIZED HEALTH CARE EDUCATION/TRAINING PROGRAM

## 2024-12-22 PROCEDURE — 97116 GAIT TRAINING THERAPY: CPT | Mod: GP

## 2024-12-22 PROCEDURE — 80048 BASIC METABOLIC PNL TOTAL CA: CPT | Performed by: FAMILY MEDICINE

## 2024-12-22 PROCEDURE — 250N000011 HC RX IP 250 OP 636: Performed by: FAMILY MEDICINE

## 2024-12-22 PROCEDURE — 36415 COLL VENOUS BLD VENIPUNCTURE: CPT | Performed by: FAMILY MEDICINE

## 2024-12-22 PROCEDURE — 99232 SBSQ HOSP IP/OBS MODERATE 35: CPT | Performed by: FAMILY MEDICINE

## 2024-12-22 PROCEDURE — 250N000013 HC RX MED GY IP 250 OP 250 PS 637: Performed by: FAMILY MEDICINE

## 2024-12-22 PROCEDURE — 82550 ASSAY OF CK (CPK): CPT | Performed by: FAMILY MEDICINE

## 2024-12-22 PROCEDURE — 85018 HEMOGLOBIN: CPT | Performed by: STUDENT IN AN ORGANIZED HEALTH CARE EDUCATION/TRAINING PROGRAM

## 2024-12-22 PROCEDURE — 97535 SELF CARE MNGMENT TRAINING: CPT | Mod: GO

## 2024-12-22 PROCEDURE — 82565 ASSAY OF CREATININE: CPT | Performed by: FAMILY MEDICINE

## 2024-12-22 PROCEDURE — 250N000011 HC RX IP 250 OP 636: Mod: JZ | Performed by: STUDENT IN AN ORGANIZED HEALTH CARE EDUCATION/TRAINING PROGRAM

## 2024-12-22 PROCEDURE — 97166 OT EVAL MOD COMPLEX 45 MIN: CPT | Mod: GO

## 2024-12-22 PROCEDURE — 97530 THERAPEUTIC ACTIVITIES: CPT | Mod: GP

## 2024-12-22 PROCEDURE — 97161 PT EVAL LOW COMPLEX 20 MIN: CPT | Mod: GP

## 2024-12-22 PROCEDURE — 120N000001 HC R&B MED SURG/OB

## 2024-12-22 RX ORDER — SODIUM CHLORIDE AND POTASSIUM CHLORIDE 150; 900 MG/100ML; MG/100ML
INJECTION, SOLUTION INTRAVENOUS CONTINUOUS
Status: ACTIVE | OUTPATIENT
Start: 2024-12-22 | End: 2024-12-22

## 2024-12-22 RX ADMIN — HYDROMORPHONE HYDROCHLORIDE 4 MG: 4 TABLET ORAL at 21:39

## 2024-12-22 RX ADMIN — LISINOPRIL 20 MG: 20 TABLET ORAL at 08:17

## 2024-12-22 RX ADMIN — ACETAMINOPHEN 975 MG: 325 TABLET ORAL at 13:39

## 2024-12-22 RX ADMIN — ACETAMINOPHEN 975 MG: 325 TABLET ORAL at 21:32

## 2024-12-22 RX ADMIN — CEFAZOLIN SODIUM 2 G: 2 INJECTION, SOLUTION INTRAVENOUS at 08:18

## 2024-12-22 RX ADMIN — ACETAMINOPHEN 975 MG: 325 TABLET ORAL at 06:22

## 2024-12-22 RX ADMIN — ASPIRIN 81 MG: 81 TABLET, COATED ORAL at 08:17

## 2024-12-22 RX ADMIN — METOPROLOL SUCCINATE 25 MG: 25 TABLET, EXTENDED RELEASE ORAL at 08:18

## 2024-12-22 RX ADMIN — ASPIRIN 81 MG: 81 TABLET, COATED ORAL at 21:32

## 2024-12-22 RX ADMIN — CEFAZOLIN SODIUM 2 G: 2 INJECTION, SOLUTION INTRAVENOUS at 00:19

## 2024-12-22 RX ADMIN — HYDROMORPHONE HYDROCHLORIDE 4 MG: 4 TABLET ORAL at 01:05

## 2024-12-22 RX ADMIN — POTASSIUM CHLORIDE AND SODIUM CHLORIDE: 900; 150 INJECTION, SOLUTION INTRAVENOUS at 06:29

## 2024-12-22 RX ADMIN — HYDROMORPHONE HYDROCHLORIDE 4 MG: 4 TABLET ORAL at 11:34

## 2024-12-22 RX ADMIN — SENNOSIDES AND DOCUSATE SODIUM 1 TABLET: 50; 8.6 TABLET ORAL at 08:17

## 2024-12-22 RX ADMIN — FAMOTIDINE 20 MG: 20 TABLET, FILM COATED ORAL at 21:33

## 2024-12-22 RX ADMIN — FAMOTIDINE 20 MG: 20 TABLET, FILM COATED ORAL at 08:17

## 2024-12-22 RX ADMIN — HYDROMORPHONE HYDROCHLORIDE 4 MG: 4 TABLET ORAL at 06:26

## 2024-12-22 RX ADMIN — SENNOSIDES AND DOCUSATE SODIUM 1 TABLET: 50; 8.6 TABLET ORAL at 21:32

## 2024-12-22 ASSESSMENT — ACTIVITIES OF DAILY LIVING (ADL)
ADLS_ACUITY_SCORE: 35
ADLS_ACUITY_SCORE: 32
ADLS_ACUITY_SCORE: 35
ADLS_ACUITY_SCORE: 32
ADLS_ACUITY_SCORE: 34
ADLS_ACUITY_SCORE: 34
ADLS_ACUITY_SCORE: 32
ADLS_ACUITY_SCORE: 35
ADLS_ACUITY_SCORE: 32
ADLS_ACUITY_SCORE: 35
ADLS_ACUITY_SCORE: 32
ADLS_ACUITY_SCORE: 32
ADLS_ACUITY_SCORE: 34
ADLS_ACUITY_SCORE: 32

## 2024-12-22 NOTE — OP NOTE
PATIENT: Richard J Bloch Jr.  MR# :   [id number]  DATE OF OPERATION: 12/21/24    SURGEON:  Ruben López MD     ASSISTANT:None    Preoperative diagnosis:   Right displaced intertrochanteric femur fracture     Postoperative diagnosis:   Right displaced intertrochanteric femur fracture     Surgical procedure:   Right femur open reduction cephalomedullary Nail    Anesthesia:  General     Drains: None    Estimated blood loss: 800 cc    IV fluids: Please see Anesthesia Report    Complications: None identified intraoperatively     Blood products: none given     Specimens: * No specimens in log *    Findings: Comminuted, displaced intertrochanteric femur fracture    COMPONENTS IMPLANTED:  Implant Name Type Inv. Item Serial No.  Lot No. LRB No. Used Action   11MM/130 DEG TI NIXON TFNA 235MM/RIGHT-STERILE Metallic Hardware/[place]   SYNTHES [id number] Right 1 Implanted   IMP SCR SYN TFNA FENESTRATED LAG 100MM 04.038.200S - [id number] Metallic Hardware/[place] IMP SCR SYN TFNA FENESTRATED LAG 100MM 04.038.200S  SYNTHES-STRATEC [id number] Right 1 Implanted   LOCKING SCREW FOR IM NAIL 5.0MM/L38MM/XL25/STER Metallic Hardware/[place]   SYNTHES [id number] Right 1 Implanted       INDICATIONS:    Richard Bloch is a 54 year old male admitted for a right intertrochanteric femur fracture.  He sustained a ground-level fall last night.  He was unable to weight-bear and was brought to the emergency department where he is found to have a displaced intertrochanteric femur fracture.  Given the unstable nature of the fracture as well as his general health, I recommended surgical intervention today.    Preoperatively, the nature of the procedure, risks and benefits, as well as alternatives including nonsurgical management were discussed in detail with the patient. I reviewed and discussed the patient's condition and relevant images with the patient. We discussed options for further evaluation and treatment, including conservative non-operative  management versus surgical intervention.      We also discussed at length the risks and benefits of surgery. Our discussion included but was not limited to the risk of pain, bleeding, infection, blood clots (DVT, PE), wound issues, continued cpain, post-operative joint stiffness, painful arc of motion, difficulty with ambulation, damage to nearby neurovascular structures, and need for further surgeries. The possibility of intra-operative and/or post-operative medical complications such as anesthesia complications or reactions, respiratory and cardiovascular events, stroke, heart attack and/or death were also discussed.     Specific details of the surgical procedure, hospitalization, recovery, rehabilitation, and long-term precautions were also presented. The final choices will be made at the time of procedure to match the anatomy and condition of the bone, ligaments, tendons, and muscles. All of patients questions were answered preoperatively at which time informed consent was taken.      PROCEDURE:    After proper identification of the patient including verification and marking of the surgical site, the patient was brought to the operating room.  General anesthesia was given without complications and prophylactic IV Ancef was confirmed to have been administered within the appropriate timeframe(s). The patient was placed in the supine position on the [place] table with All bony prominences were well padded.  I began by performing a reduction maneuver of inline traction, internal rotation and adduction.  I noticed persistent displacement of the fracture and made the decision to proceed with further reduction.  The surgical site which was properly marked, was then prepped and draped in the usual sterile fashion. A timeout was done utilizing protocol to again identify the correct patient and operative site, which was marked appropriately.    I began by making a small poke incision just proximal to the tip of the greater  trochanter.  A guidewire was introduced, attempting to place it medially given the displacement of the greater trochanteric fragment.  I then extended the poke incision and passed the guidewire.  I then used a coring reamer, again pushing medial to core out some of the medial neck.  I then placed a long ball-tipped guidewire.  I proceeded to sequentially ream and then place an 11 mm nail.  Upon placing the nail, I noticed lateralization of the shaft.  At this point, I placed a bone hook to try and pull the neck back to the shaft fragment.  I was having significant difficulty with reduction and therefore made the decision to extend the incision to allow for better fracture visualization.  I incised through the lateral IT band and down to the level of bone.  Utilizing a bone hook as well as my hand, I was able to adequately reduce the fracture.  I then placed the trocar for the cephalomedullary screw.  The guidewire was advanced and confirmed in appropriate position on the AP and lateral x-rays.  I then proceeded to drill and placed an 100 mm screw.  The screw was then tightened to place and taken off one half turn to allow for compression.  I used a compression not to provide compression across the fracture site.  I then fully tightened the cephalomedullary screw.  I then utilized the guide to place a distal interlocking screw.  A 38 mm screw was placed.  Final AP and lateral x-rays were taken and the guide was removed.  I closed the incisions with 0 Vicryl, 2-0 Vicryl and 3-0 Monocryl along with Dermabond.  A soft sterile dressing was placed.      The patient was then awakened from anesthesia and transferred the PACU in stable condition.    Sponge, needle, and instrument counts were correct at the conclusion of the procedure. The patient has palpable distal pulses in both lower extremity.     Postoperative Plan:  Pain Control: Continue per pain protocol.  Weight Bearing: Foot flat weightbearing with a walker x 6  weeks  DVT Prophylaxis: ASA 81 mg BID  Antibiotics: 24 hours of perioperative antibiotics  GI: Plan for aggressive bowel regimen to prevent constipation from narcotic medications  Lines: HLIV once tolerating PO  PT/OT: Eval and treatment. Will follow up on recommendations.  Follow up:  in 2 weeks in clinic for wound check  Discharge plan: to home vs. TCU    Dispo: stable to pacu    Ruben López MD  Orthopedic Surgery  [facility]

## 2024-12-22 NOTE — CONSULTS
Care Management Discharge Note    Discharge Date: 12/23/2024       Discharge Disposition: Home    Discharge Services: None    Discharge DME: Walker    Discharge Transportation: family or friend will provide    Private pay costs discussed: Not applicable    Does the patient's insurance plan have a 3 day qualifying hospital stay waiver?  No    PAS Confirmation Code: N/A  Patient/family educated on Medicare website which has current facility and service quality ratings: no    Education Provided on the Discharge Plan: No  Persons Notified of Discharge Plans: Pt   Patient/Family in Agreement with the Plan: yes    Handoff Referral Completed: Yes, [facility] PCP: Internal handoff referral completed    Additional Information:  Chart reviewed.  No CM needs identified.  Therapy recs for home with assist.  Has wife and adult children at home.  Family to transport when medically ready for discharge.     No further care management intervention anticipated at this time.  Please re-consult if further needs arise.  Care management signing off.       2:28 PM  SWCM was asked by Nurse to see Pt's wife and Pt's sister.  Wife is anxious about Pt being home and asked about TCU as wife works during the day.  SWCM explained that Therapy saw and not recommending TCU.  Pt has [facility] and would need prior auth and may not be approved as Pt is doing well in therapy. OT stated that Pt was able to do transfers and toileting independent with cues.  OT reviewed possible equipment suggestions and family will provide.  SWCM went to Pt's room and Pt states he is feeling good about getting around.      PRABHJOT Juárez

## 2024-12-22 NOTE — PROGRESS NOTES
12/22/24 1400   Appointment Info   Signing Clinician's Name / Credentials (OT) Pai Uribe, OTR/L   Rehab Comments (OT) OT Eval   Living Environment   People in Home child(aditya), adult;spouse   Current Living Arrangements house   Living Environment Comments Split level home-pt is able to stay on one level once in the home. Pt has standard toilet-planning to get a commode. WIS-interested in shower chair.   Self-Care   Usual Activity Tolerance good   Current Activity Tolerance fair   Equipment Currently Used at Home none   Fall history within last six months yes   Number of times patient has fallen within last six months 1   Instrumental Activities of Daily Living (IADL)   IADL Comments Pt is I with BADLs/IADLs at baseline   General Information   Onset of Illness/Injury or Date of Surgery 12/21/24   Referring Physician Ruben López MD   Patient/Family Therapy Goal Statement (OT) To go home   Additional Occupational Profile Info/Pertinent History of Current Problem s/p R ORIF-per chart  54 year old male with PMH signficant for MARIA TERESA on CPAP, Vasquez's esophagus/GERD with hiatal hernia, HTN, hypertriglyceridemia, hepatic steatosis and alcohol overuse.  12/21/2024 early a.m. fell down stairs having right leg pain.  Did not come to the ED until 1130 hrs. Sodium 131, creatinine 1.24, CK 1849.  X-ray showed right femoral fracture with displacement.  CT scan head and neck unremarkable.  [facility] Dr. López consulted and underwent ORIF of right hip.   Existing Precautions/Restrictions no known precautions/restrictions  (per surgical approach)   Right Lower Extremity (Weight-bearing Status) partial weight-bearing (PWB)  (flat foot weight bearing)   Cognitive Status Examination   Orientation Status orientation to person, place and time   Cognitive Status Comments Following directions appropriately and advocating for sefl   Pain Assessment   Patient Currently in Pain Yes, see Vital Sign flowsheet   Posture    Posture not impaired   Range of Motion Comprehensive   Comment, General Range of Motion WFL   Strength Comprehensive (MMT)   Comment, General Manual Muscle Testing (MMT) Assessment WFL   Bed Mobility   Bed Mobility supine-sit;sit-supine   Comment (Bed Mobility) Min A   Transfers   Transfers bed-chair transfer;sit-stand transfer;toilet transfer;shower transfer   Transfer Skill: Bed to Chair/Chair to Bed   Bed-Chair Becker (Transfers) contact guard   Sit-Stand Transfer   Sit-Stand Becker (Transfers) contact guard   Shower Transfer   Becker Level (Shower Transfer) contact guard   Toilet Transfer   Becker Level (Toilet Transfer) minimum assist (75% patient effort)   Balance   Balance Comments SBA   Activities of Daily Living   BADL Assessment/Intervention lower body dressing;toileting   Lower Body Dressing Assessment/Training   Becker Level (Lower Body Dressing) minimum assist (75% patient effort)   Toileting   Comment, (Toileting) CGA   Clinical Impression   Criteria for Skilled Therapeutic Interventions Met (OT) Yes, treatment indicated   OT Diagnosis Decreased Adl Becker   Influenced by the following impairments S/p ORIF R LE   OT Problem List-Impairments impacting ADL activity tolerance impaired;fear & anxiety;mobility;post-surgical precautions;pain   Assessment of Occupational Performance 3-5 Performance Deficits   Identified Performance Deficits dressing, bathing, toileting, fx  mob, bed mob,   Planned Therapy Interventions (OT) ADL retraining;bed mobility training;transfer training   Clinical Decision Making Complexity (OT) detailed assessment/moderate complexity   Risk & Benefits of therapy have been explained evaluation/treatment results reviewed;care plan/treatment goals reviewed;risks/benefits reviewed   OT Total Evaluation Time   OT Eval, Moderate Complexity Minutes (67017) 10   OT Goals   Therapy Frequency (OT) Daily   OT Predicted Duration/Target Date for Goal  Attainment 12/29/24   OT Goals Lower Body Dressing;Bed Mobility;Toilet Transfer/Toileting   OT: Lower Body Dressing Modified independent;using adaptive equipment   OT: Bed Mobility Modified independent   OT: Toilet Transfer/Toileting Modified independent;toilet transfer   Interventions   Interventions Quick Adds Self-Care/Home Management   Self-Care/Home Management   Self-Care/Home Mgmt/ADL, Compensatory, Meal Prep Minutes (69558) 42   Symptoms Noted During/After Treatment (Meal Preparation/Planning Training) increased pain   Treatment Detail/Skilled Intervention Pt in bed upon arrival and ready to get OOB to go to bathroom. Give HOB elevated   provided vc w/use of leg  to EOB. Needing cues for PLB throughout task, pt becoming more aware of when he is holding his breath with activity. Bed transfers w/CGA and vc-height of bed increased for to match home set up. SBA to/from bathroom w/FWW given vc for safe tech in tight spaces around room. Stood in front of toilet w/FWW over toilet for uriniating w/SBA. Washed hands w/SBA. Sit>supine w/SBA, vc and leg . Wife and family arrived towards end of session, edcuated family about AE needs for toileting, bathing, bed mob and dressing.Provided resources about AE and where to purchase.   OT Discharge Planning   OT Plan Bed mob, LE dressing, commode transfer   OT Discharge Recommendation (DC Rec) home with assist   OT Rationale for DC Rec Pt has good support at home   OT Brief overview of current status SBA w/vc for bed mob/fx transfers/toileting   OT Equipment Needed at Discharge commode chair;shower chair   Total Session Time   Timed Code Treatment Minutes 42   Total Session Time (sum of timed and untimed services) 52

## 2024-12-22 NOTE — PROGRESS NOTES
[facility] MEDICINE  PROGRESS NOTE     Code Status: Full Code  Procedure(s):  INTERNAL FIXATION, FRACTURE, TROCHANTERIC, HIP, USING PINS OR RODS  1 Day Post-Op  Assessment and Plan:  Richard J Bloch Jr. is a 54 year old male with PMH signficant for MARIA TERESA on CPAP, Vasquez's esophagus/GERD with hiatal hernia, HTN, hypertriglyceridemia, hepatic steatosis and alcohol overuse.  12/21/2024 early a.m. fell down stairs having right leg pain.  Did not come to the ED until 1130 hrs. Sodium 131, creatinine 1.24, CK 1849.  X-ray showed right femoral fracture with displacement.  CT scan head and neck unremarkable.  [facility] Dr. López consulted and underwent ORIF of right hip.  Postoperatively doing well.  Pain under reasonable control.  Rhabdomyolysis and KAMALA improving with IV fluids.  On a fluid restriction due to the hyponatremia.  Potential discharge 12/23.        Fall  Right comminuted intertrochanteric fracture of right femur  Status post right hip ORIF pinning  [facility] Dr. López consultation appreciated.  Doing well postoperatively working with therapy.  Continue Tylenol and as needed narcotics.    Acute kidney injury  Acute rhabdomyolysis  Hydrated with IV fluids.  Admission CK 1849--> 1194.  Admission creatinine 1.24 improved to 1.02.  No further checks indicated.     Hyponatremia  Admission sodium 131.  Similar to prior baseline.  May be secondary to his alcohol use.  TSH normal.  Placed on fluid restriction 1800 mL per 24 hours.  Postoperatively sodium down to 129.  Decrease IV fluids to 50 mL/h x 10 hours.  Recheck sodium level in the AM.    Acute blood loss anemia  Preoperative hemoglobin 12.8.  Postoperatively down to 9.1.  No indication for transfusion at this time.  Follow per protocols.     MARIA TERESA  Utilize home CPAP.  Follow pulse oximetry per standard protocols.     GERD with hiatal hernia  History of Vasquez's esophagus  Transitioned his omeprazole to Pepcid  due to the hyponatremia.     Essential hypertension  Continue metoprolol and lisinopril with hold parameters.    Hold his hydrochlorothiazide due to the hyponatremia.     Hepatic steatosis  Noted in the past.     Alcohol overuse  Patient admits to drinking 5-10 shots of vodka on Fridays and Saturdays.  Denies history of alcohol dependency treatment, DUI nor ever using intravenous drugs.  No indication for CIWA protocols at this time but follow clinically.  Critical importance of avoiding alcohol while taking pain medication discussed with the patient.     Anticoagulation   Aspirin 81 mg twice daily per orthopedics.  Routine postoperative risk.    Fluids: NS 20 KCl at 50 mL/h for the next 10 hours  Pain meds: Per surgery  Therapy: PT OT consulted.  Lai:Not present  Lines: None       Current Diet  Orders Placed This Encounter      Advance Diet as Tolerated: Regular Diet Adult    Supplements  Active Nourishment Order   Procedures    Fluid restriction 1800 ML FLUID     Lines/Drains/Airways       PIV Line  Duration             Peripheral IV 12/22/24 Right Hand <1 day              Wound  Duration             Incision/Surgical Site -- days    Incision/Surgical Site 12/21/24 Anterior;Right Thigh <1 day                  Barriers to Discharge: Further work with therapy, hyponatremia, IV fluids    Disposition: Anticipate discharge 12/23  Medically Ready for Discharge: Anticipated Tomorrow       Clinically Significant Risk Factors Present on Admission         # Hyponatremia: Lowest Na = 129 mmol/L in last 2 days, will monitor as appropriate  # Hypochloremia: Lowest Cl = 89 mmol/L in last 2 days, will monitor as appropriate       # Coagulation Defect: INR = 1.16 (Ref range: 0.85 - 1.15) and/or PTT = N/A, will monitor for bleeding  # Thrombocytopenia: Lowest platelets = 105 in last 2 days, will monitor for bleeding   # Hypertension: Noted on problem list      # Anemia: based on hgb <11                  Interval  History/Subjective:  Patient doing well.  Pain under reasonable control.  Was able to walk with therapy and do some stairs.  Was quite weak but is motivated.  Denies any chest pain shortness of breath nausea or vomiting.  Following fluid restriction.  Sister is present and supportive.  Questions answered to verbalized satisfaction.      Last 24H PRN:     HYDROmorphone (DILAUDID) injection 0.2 mg **OR** HYDROmorphone (DILAUDID) injection 0.4 mg, 0.4 mg at 12/21/24 1934    HYDROmorphone (DILAUDID) tablet 2 mg **OR** HYDROmorphone (DILAUDID) tablet 4 mg, 4 mg at 12/22/24 1134    Physical Exam/Objective:  Temp:  [97.3  F (36.3  C)-98.8  F (37.1  C)] 97.3  F (36.3  C)  Pulse:  [] 104  Resp:  [10-24] 18  BP: (106-117)/(64-76) 113/64  SpO2:  [94 %-100 %] 98 %  Wt Readings from Last 4 Encounters:   12/21/24 74.8 kg (165 lb)   04/02/24 82.1 kg (181 lb)   02/28/23 89.8 kg (198 lb)   05/03/22 85.7 kg (189 lb)     Body mass index is 23.68 kg/m .    Constitutional: awake, alert, cooperative, no apparent distress, and appears stated age  ENT: Normocephalic, without obvious abnormality, atraumatic, external ears without lesions, oral pharynx with moist mucous membranes, tonsils without erythema or exudates, gums normal and good dentition.  Respiratory: No increased work of breathing, good air exchange, clear to auscultation bilaterally, no crackles or wheezing  Cardiovascular: Normal apical impulse, regular rate and rhythm, normal S1 and S2, no S3 or S4, and no murmur noted  GI: No scars, normal bowel sounds, soft, non-distended, non-tender, no masses palpated, no hepatosplenomegally  Skin: normal skin color, texture, turgor, no redness, warmth, or swelling, and no rashes  Musculoskeletal: Limited exam due to postoperative status.  Has good function at the ankles and toes bilaterally.  Strong pulses. no lower extremity pitting edema present  Neurologic: Cranial nerves II-XII are grossly intact. Sensory:  Sensory  intact  Neuropsychiatric: General: normal, calm, and normal eye contact Level of consciousness: alert / normal Affect: normal Orientation: oriented to self, place, time and situation Memory and insight: normal, memory for past and recent events intact, and thought process normal      Medications:   Personally Reviewed.  Medications   Current Facility-Administered Medications   Medication Dose Route Frequency Provider Last Rate Last Admin    0.9% sodium chloride + KCl 20 mEq/L infusion   Intravenous Continuous Ruben López  mL/hr at 12/22/24 0629 New Bag at 12/22/24 0629     Current Facility-Administered Medications   Medication Dose Route Frequency Provider Last Rate Last Admin    acetaminophen (TYLENOL) tablet 975 mg  975 mg Oral Q8H Ruben López MD   975 mg at 12/22/24 0622    aspirin EC tablet 81 mg  81 mg Oral BID Ruben López MD   81 mg at 12/22/24 0817    famotidine (PEPCID) tablet 20 mg  20 mg Oral BID Ruben López MD   20 mg at 12/22/24 0817    lisinopril (ZESTRIL) tablet 20 mg  20 mg Oral Daily Ruben López MD   20 mg at 12/22/24 0817    metoprolol succinate ER (TOPROL XL) 24 hr tablet 25 mg  25 mg Oral QAM Ruben López MD   25 mg at 12/22/24 0818    polyethylene glycol (MIRALAX) Packet 17 g  17 g Oral Daily Ruben López MD        senna-docusate (SENOKOT-S/PERICOLACE) 8.6-50 MG per tablet 1 tablet  1 tablet Oral BID Ruben López MD   1 tablet at 12/22/24 0817    sodium chloride (PF) 0.9% PF flush 3 mL  3 mL Intracatheter Q8H Ruben López MD        sodium chloride (PF) 0.9% PF flush 3 mL  3 mL Intracatheter Q8H Ruben López MD   3 mL at 12/22/24 0456       Data reviewed today: I personally reviewed all new medications, labs, imaging/diagnostics reports over the past 24 hours. Pertinent findings include:    Imaging:   Recent Results (from the past 24 hours)   Head CT w/o contrast    Narrative    EXAM: CT HEAD W/O CONTRAST, CT CERVICAL SPINE W/O CONTRAST  LOCATION: [facility]  DATE: 12/21/2024    INDICATION: Fall fall down stairs. Femoral fracture.  COMPARISON: CT head 3/27/2018  TECHNIQUE:   1) Routine CT Head without IV contrast. Multiplanar reformats. Dose reduction techniques were used.  2) Routine CT Cervical Spine without IV contrast. Multiplanar reformats. Dose reduction techniques were used.    FINDINGS:   HEAD CT:   INTRACRANIAL CONTENTS: No intracranial hemorrhage, extraaxial collection, or mass effect.  No CT evidence of acute infarct. Mild presumed chronic small vessel ischemic changes. Mild generalized volume loss. No hydrocephalus.     VISUALIZED ORBITS/SINUSES/MASTOIDS: No intraorbital abnormality. No paranasal sinus mucosal disease. No middle ear or mastoid effusion.    BONES/SOFT TISSUES: No scalp hematoma. No skull fracture.    CERVICAL SPINE CT:   VERTEBRA: Mildly straightened cervical lordosis and 2 mm degenerative type retrolisthesis at C5-6. Preserved vertebral body heights. No fracture or posttraumatic subluxation.     CANAL/FORAMINA: Mild cervical spondylosis, with loss of disc height and endplate/uncovertebral spurring greatest at C5-6 and C6-7. At C5-6, mild spinal canal stenosis with moderate to severe right and mild to moderate left neural foraminal stenosis. At   C6-7, mild bilateral neural foraminal stenosis.    PARASPINAL: Prevertebral soft tissues within normal limits for thickness. Visualized lung fields are clear.      Impression    IMPRESSION:  HEAD CT:  1.  No CT evidence for acute intracranial process.  2.  Brain atrophy and presumed chronic microvascular ischemic changes as above.    CERVICAL SPINE CT:  1.  No CT evidence for acute fracture or post traumatic subluxation.  2.  Mild cervical spondylosis greatest at C5-C6 as above.   CT Cervical Spine w/o Contrast    Narrative    EXAM: CT HEAD W/O CONTRAST, CT CERVICAL SPINE W/O CONTRAST  LOCATION: [facility]  DATE: 12/21/2024    INDICATION: Fall  fall down stairs. Femoral fracture.  COMPARISON: CT head 3/27/2018  TECHNIQUE:   1) Routine CT Head without IV contrast. Multiplanar reformats. Dose reduction techniques were used.  2) Routine CT Cervical Spine without IV contrast. Multiplanar reformats. Dose reduction techniques were used.    FINDINGS:   HEAD CT:   INTRACRANIAL CONTENTS: No intracranial hemorrhage, extraaxial collection, or mass effect.  No CT evidence of acute infarct. Mild presumed chronic small vessel ischemic changes. Mild generalized volume loss. No hydrocephalus.     VISUALIZED ORBITS/SINUSES/MASTOIDS: No intraorbital abnormality. No paranasal sinus mucosal disease. No middle ear or mastoid effusion.    BONES/SOFT TISSUES: No scalp hematoma. No skull fracture.    CERVICAL SPINE CT:   VERTEBRA: Mildly straightened cervical lordosis and 2 mm degenerative type retrolisthesis at C5-6. Preserved vertebral body heights. No fracture or posttraumatic subluxation.     CANAL/FORAMINA: Mild cervical spondylosis, with loss of disc height and endplate/uncovertebral spurring greatest at C5-6 and C6-7. At C5-6, mild spinal canal stenosis with moderate to severe right and mild to moderate left neural foraminal stenosis. At   C6-7, mild bilateral neural foraminal stenosis.    PARASPINAL: Prevertebral soft tissues within normal limits for thickness. Visualized lung fields are clear.      Impression    IMPRESSION:  HEAD CT:  1.  No CT evidence for acute intracranial process.  2.  Brain atrophy and presumed chronic microvascular ischemic changes as above.    CERVICAL SPINE CT:  1.  No CT evidence for acute fracture or post traumatic subluxation.  2.  Mild cervical spondylosis greatest at C5-C6 as above.   XR Pelvis and Hip Right 2 Views    Narrative    EXAM: XR PELVIS AND HIP RIGHT 2 VIEWS  LOCATION: [facility]  DATE: 12/21/2024    INDICATION: Pain, fall  COMPARISON: None.      Impression    IMPRESSION: Comminuted intertrochanteric  fracture of the right femur with proximal displacement of the distal fracture fragment and mild displacement of the greater and lesser tuberosity fracture fragments.   XR Surgery ORTEGA  Fluoro G/T 5 Min    Narrative    This exam was marked as non-reportable because it will not be read by a   radiologist or a [place] non-radiologist provider.           No results found for this or any previous visit (from the past 4320 hours).    Labs:  XR Surgery ORTEGA  Fluoro G/T 5 Min   Final Result      XR Pelvis and Hip Right 2 Views   Final Result   IMPRESSION: Comminuted intertrochanteric fracture of the right femur with proximal displacement of the distal fracture fragment and mild displacement of the greater and lesser tuberosity fracture fragments.      CT Cervical Spine w/o Contrast   Final Result   IMPRESSION:   HEAD CT:   1.  No CT evidence for acute intracranial process.   2.  Brain atrophy and presumed chronic microvascular ischemic changes as above.      CERVICAL SPINE CT:   1.  No CT evidence for acute fracture or post traumatic subluxation.   2.  Mild cervical spondylosis greatest at C5-C6 as above.      Head CT w/o contrast   Final Result   IMPRESSION:   HEAD CT:   1.  No CT evidence for acute intracranial process.   2.  Brain atrophy and presumed chronic microvascular ischemic changes as above.      CERVICAL SPINE CT:   1.  No CT evidence for acute fracture or post traumatic subluxation.   2.  Mild cervical spondylosis greatest at C5-C6 as above.        Recent Results (from the past 24 hours)   CBC (+ platelets, no diff)    Collection Time: 12/21/24 12:17 PM   Result Value Ref Range    WBC Count 6.4 4.0 - 11.0 10e3/uL    RBC Count      Hemoglobin 12.8 (L) 13.3 - 17.7 g/dL    Hematocrit      MCV      MCH      MCHC      RDW      Platelet Count 105 (L) 150 - 450 10e3/uL   Basic metabolic panel    Collection Time: 12/21/24 12:17 PM   Result Value Ref Range    Sodium 131 (L) 135 - 145 mmol/L    Potassium 3.8 3.4 - 5.3  mmol/L    Chloride 89 (L) 98 - 107 mmol/L    Carbon Dioxide (CO2) 24 22 - 29 mmol/L    Anion Gap 18 (H) 7 - 15 mmol/L    Urea Nitrogen 17.7 6.0 - 20.0 mg/dL    Creatinine 1.24 (H) 0.67 - 1.17 mg/dL    GFR Estimate 70 >60 mL/min/1.73m2    Calcium 9.7 8.8 - 10.4 mg/dL    Glucose 118 (H) 70 - 99 mg/dL   CK total    Collection Time: 12/21/24 12:17 PM   Result Value Ref Range    CK 1,849 (HH) 39 - 308 U/L   RBC and Platelet Morphology    Collection Time: 12/21/24 12:17 PM   Result Value Ref Range    RBC Morphology Confirmed RBC Indices     Platelet Assessment  Automated Count Confirmed. Platelet morphology is normal.     Automated Count Confirmed. Platelet morphology is normal.   TSH with free T4 reflex    Collection Time: 12/21/24 12:17 PM   Result Value Ref Range    TSH 1.16 0.30 - 4.20 uIU/mL   INR    Collection Time: 12/21/24  8:43 PM   Result Value Ref Range    INR 1.16 (H) 0.85 - 1.15   Adult Type and Screen    Collection Time: 12/21/24  8:43 PM   Result Value Ref Range    ABO/RH(D) A POS     Antibody Screen Negative Negative    SPECIMEN EXPIRATION DATE [id number]    Basic metabolic panel    Collection Time: 12/22/24  6:07 AM   Result Value Ref Range    Sodium 129 (L) 135 - 145 mmol/L    Potassium 4.4 3.4 - 5.3 mmol/L    Chloride 93 (L) 98 - 107 mmol/L    Carbon Dioxide (CO2) 23 22 - 29 mmol/L    Anion Gap 13 7 - 15 mmol/L    Urea Nitrogen 20.1 (H) 6.0 - 20.0 mg/dL    Creatinine 1.02 0.67 - 1.17 mg/dL    GFR Estimate 88 >60 mL/min/1.73m2    Calcium 8.5 (L) 8.8 - 10.4 mg/dL    Glucose 138 (H) 70 - 99 mg/dL   CK total    Collection Time: 12/22/24  6:07 AM   Result Value Ref Range    CK 1,194 (HH) 39 - 308 U/L   Hemoglobin    Collection Time: 12/22/24  6:07 AM   Result Value Ref Range    Hemoglobin 9.1 (L) 13.3 - 17.7 g/dL       Pending Labs:  Unresulted Labs Ordered in the Past 30 Days of this Admission       No orders found for last 31 day(s).              Justin Gonzalez MD  Hospitalist  [facility] Medicine  M  [facility]  Phone: #[phone]

## 2024-12-22 NOTE — PROGRESS NOTES
12/22/24 1002   Appointment Info   Signing Clinician's Name / Credentials (PT) Yumiko Alexander PT DPT [facility] CHT   Rehab Comments (PT) anxious needing frequent cues of encouragement/talking through technique   Living Environment   People in Home spouse;child(aditya), adult   Current Living Arrangements house   Home Accessibility stairs to enter home;stairs within home   Number of Stairs, Main Entrance 1   Stair Railings, Main Entrance none   Number of Stairs, Within Home, Primary eight   Stair Railings, Within Home, Primary railing on right side (ascending);railing on left side (ascending)   Transportation Anticipated family or friend will provide   Living Environment Comments Pt is normally independent and works full time (days)   Self-Care   Equipment Currently Used at Home none   Fall history within last six months yes   Number of times patient has fallen within last six months 1   General Information   Onset of Illness/Injury or Date of Surgery 12/20/24   Referring Physician Ruben López MD   Patient/Family Therapy Goals Statement (PT) return home with family   Pertinent History of Current Problem (include personal factors and/or comorbidities that impact the POC) Richard J Bloch . is a 54 year old male with PMH signficant for MARIA TERESA on CPAP, Vasquez's esophagus/GERD with hiatal hernia, HTN, hypertriglyceridemia, hepatic steatosis and alcohol overuse.  12/21/2024 early a.m. fell down stairs having right leg pain.  Did not come to the ED until 1130 hrs. Sodium 131, creatinine 1.24, CK 1849.  X-ray showed right femoral fracture with displacement.   Existing Precautions/Restrictions weight bearing   Weight-Bearing Status - RLE   (foot flat weightbearing)   Cognition   Affect/Mental Status (Cognition) WNL   Orientation Status (Cognition) oriented x 4   Follows Commands (Cognition) WNL   Range of Motion (ROM)   Range of Motion ROM deficits secondary to surgical procedure   Strength (Manual Muscle Testing)   Strength  (Manual Muscle Testing) Deficits observed during functional mobility   Bed Mobility   Comment, (Bed Mobility) supine <> sit with Min A R LE   Transfers   Comment, (Transfers) sit <> stand with FFWB/FWW and CGA   Gait/Stairs (Locomotion)   [place] Level (Gait) contact guard;1 person assist;1 person to manage equipment   Assistive Device (Gait) walker, front-wheeled   Distance in Feet (Gait) 10'   Pattern (Gait) step-to   Deviations/Abnormal Patterns (Gait) gait speed decreased;stride length decreased   Maintains Weight-bearing Status (Gait) able to maintain   Clinical Impression   Criteria for Skilled Therapeutic Intervention Yes, treatment indicated   PT Diagnosis (PT) Impaired functional mobility   Influenced by the following impairments weakness, pain   Functional limitations due to impairments ambulation/stairs, transfers   Clinical Presentation (PT Evaluation Complexity) stable   Clinical Presentation Rationale Pt presents as medically diagnosed   Clinical Decision Making (Complexity) low complexity   Planned Therapy Interventions (PT) gait training;home exercise program;stair training;transfer training   Risk & Benefits of therapy have been explained evaluation/treatment results reviewed;care plan/treatment goals reviewed;risks/benefits reviewed;current/potential barriers reviewed;participants voiced agreement with care plan;participants included;patient   PT Total Evaluation Time   PT Eval, Low Complexity Minutes (43677) 10   Physical Therapy Goals   PT Frequency Daily   PT Predicted Duration/Target Date for Goal Attainment 12/25/24   PT Goals Transfers;Gait;Stairs;PT Goal 1   PT: Transfers Supervision/stand-by assist;Sit to/from stand;Bed to/from chair;Assistive device;Within precautions   PT: Gait Supervision/stand-by assist;100 feet;Rolling walker;Within precautions   PT: Stairs Supervision/stand-by assist;8 stairs;Rail on right;Within precautions;Assistive device   PT: Goal 1 Pt to be able to perform  [place] [facility] [place] supervision   Interventions   Interventions Quick Adds Gait Training;Therapeutic Activity;Therapeutic Procedure   Therapeutic Activity   Therapeutic Activities: dynamic activities to improve functional performance Minutes (67190) 10   Symptoms Noted During/After Treatment Increased pain   Treatment Detail/Skilled Intervention Supine <> sit with Min A to R LE; sit <> stand with FWW/CGA   Gait Training   Gait Training Minutes (07476) 23   Symptoms Noted During/After Treatment (Gait Training) fatigue;increased pain   Treatment Detail/Skilled Intervention Pt instructed on use of FWW with R LE FFWB and CGA.  STAIRS: up 4 steps with cues on technique, B crutches and CGA; Down 4 steps with 1 crutch/rali with cues/CGA.  Able to maintain FFWB R LE throughout session.  Strong encouragement with frequent reminders throughout session.  Some tremors that appeared worse with increased anxiety.   Distance in Feet 50' x 2   [place] Level (Gait Training) contact guard   Physical Assistance Level (Gait Training) 1 person + 1 person to manage equipment   Weight Bearing (Gait Training)   (FFWB R LE)   Assistive Device (Gait Training) rolling walker   Pattern Analysis (Gait Training) swing-to gait   Gait Analysis Deviations decreased russell;decreased step length;decreased stride length;decreased swing-to-stance ratio;decreased toe-to-floor clearance   Impairments (Gait Analysis/Training) pain;strength decreased;ROM decreased   Stair Railings present on right side   Physical Assist/Nonphysical Assist (Stairs) verbal cues;1 person assist   Level of [place] (Stairs) contact guard   PT Discharge Planning   PT Plan initiate PRITI HEP; progress transfers/amb/stairs   PT Discharge Recommendation (DC Rec) (S)  home with assist   PT Rationale for DC Rec Pt lives in supportive environment with family; anxious/fearful to walk/perform stairs   PT Brief overview of current status amb 50 ft x 2 with FWW/CGA;  transfers with  CGA; stairs with CGA. Able to maintain FFWB throughout session   PT Equipment Needed at Discharge crutches, axillary;walker, rolling   Physical Therapy Time and Intention   Timed Code Treatment Minutes 33   Total Session Time (sum of timed and untimed services) 43

## 2024-12-22 NOTE — PLAN OF CARE
Goal Outcome Evaluation:    Note from 7840-2631. VSS on RA. Denies shortness of breath. A&Ox4. Pt rated pain 10/10 in right hip, 4mg PO dilaudid guiven. No new skin issues noted. Dressing is CDI. Full sensation per pt. Assist of 2 with walker/gait belt for transferring. Left PIV running NS/KCl at 125mL/hr. Left hand IV dislodged. Voiding adequately & passing flatus. Daughter at bedside. Care management consult placed. Education of medication administration and use of call-light to reduce risk for falls and injury. No further issues noted.     Problem: Hip Fracture Medical Management  Goal: Pain Control and Function  Outcome: Progressing     Problem: Hip Fracture Medical Management  Goal: Effective Urinary Elimination  Outcome: Progressing     Problem: Adult Inpatient Plan of Care  Goal: Readiness for Transition of Care  Outcome: Progressing

## 2024-12-22 NOTE — PLAN OF CARE
Problem: Adult Inpatient Plan of Care  Goal: Absence of Hospital-Acquired Illness or Injury  Intervention: Identify and Manage Fall Risk  Recent Flowsheet Documentation  Taken 12/22/2024 1250 by José Hendricks RN  Safety Promotion/Fall Prevention:   safety round/check completed   room organization consistent   mobility aid in reach   clutter free environment maintained  Taken 12/22/2024 0943 by José Hendricks RN  Safety Promotion/Fall Prevention:   safety round/check completed   room organization consistent   mobility aid in reach   clutter free environment maintained  Taken 12/22/2024 0830 by José Hendricks RN  Safety Promotion/Fall Prevention:   safety round/check completed   room organization consistent   mobility aid in reach   clutter free environment maintained     Problem: Adult Inpatient Plan of Care  Goal: Optimal Comfort and Wellbeing  Outcome: Progressing   Goal Outcome Evaluation:       Patient vital signs are at baseline: Yes  Patient able to ambulate as they were prior to admission or with assist devices provided by therapies during their stay:  Yes  Patient MUST void prior to discharge:  Yes  Patient able to tolerate oral intake:  Yes  Pain has adequate pain control using Oral analgesics:  Yes  Does patient have an identified :  Yes  Has goal D/C date and time been discussed with patient:  Yes        A&Ox4. VSS-RA. A1 gaitbelt and walker. 1800mL fluid restriction. Tolerating oral intake. When initially standing pt would have upper and lower body tremors, tremors would lessen after standing at side of bed. Denies SOB, chest pains, lightheadedness, or n/v. Call light and phone within reach.

## 2024-12-22 NOTE — PROGRESS NOTES
Pt home CPAP in room but pt refused setup. Pt stated that he doesn't sleep well with CPAP.     Lulu Winston, RT

## 2024-12-22 NOTE — PLAN OF CARE
Goal Outcome Evaluation:      Plan of Care Reviewed With: other (see comments)    Overall Patient Progress: improvingOverall Patient Progress: improving    Outcome Evaluation: Pt will return home with family.  No CM needs identifoed at this time.

## 2024-12-22 NOTE — PLAN OF CARE
Problem: Hip Fracture Medical Management  Goal: Pain Control and Function  Intervention: Manage Acute Orthopaedic-Related Pain  Recent Flowsheet Documentation  Taken 12/21/2024 2120 by Sharon Weber RN  Compartment Syndrome Management: active flexion/extension encouraged       Pt A&Ox4. VSS on RA, except tachy at times. CMS intact. Dressing c/d/i. Voiding adequately. Up with assist of 2 with gait belt and walker to bedside commode. Pain managed with PRN Dilaudid 4 mg. NS with 20 KCL infusing at 125 ml/hr. Dr. Gonzalez paged regarding blood loss of 800 ml during surgery and need for hemoglobin recheck tonight. Call back, hemodynamically stable so okay to wait for AM hemoglobin. Therapies in AM.

## 2024-12-22 NOTE — ANESTHESIA POSTPROCEDURE EVALUATION
Patient: Richard J Bloch Jr.    Procedure: Procedure(s):  INTERNAL FIXATION, FRACTURE, TROCHANTERIC, HIP, USING PINS OR RODS       Anesthesia Type:  General    Note:     Postop Pain Control: Uneventful            Sign Out: Well controlled pain   PONV: No   Neuro/Psych: Uneventful            Sign Out: Acceptable/Baseline neuro status   Airway/Respiratory: Uneventful            Sign Out: Acceptable/Baseline resp. status   CV/Hemodynamics: Uneventful            Sign Out: Acceptable CV status; No obvious hypovolemia; No obvious fluid overload   Other NRE: NONE   DID A NON-ROUTINE EVENT OCCUR? No           Last vitals:  Vitals Value Taken Time   /71 12/21/24 1840   Temp 37.1  C (98.7  F) 12/21/24 1820   Pulse 84 12/21/24 1847   Resp 16 12/21/24 1847   SpO2 100 % 12/21/24 1847   Vitals shown include unfiled device data.    Electronically Signed By: Kyle Miguel MD  December 21, 2024  6:49 PM

## 2024-12-22 NOTE — PROGRESS NOTES
"Ortho Progress Note      1 Day Post-Op     Procedure(s):  INTERNAL FIXATION, FRACTURE, TROCHANTERIC, HIP, USING PINS OR RODS       Subjective:    Pain: Pain is resonably controlled with current analgesics.    Chest pain, SOB:  No  Nausea, vomiting:  No  Lightheadedness, dizziness:  No  Neuro:  Patient denies numbness or paresthesias    Objective:  Vital signs in last 24 hours  Temp:  [97.3  F (36.3  C)-98.8  F (37.1  C)] 97.3  F (36.3  C)  Pulse:  [] 104  Resp:  [10-24] 18  BP: (106-117)/(64-76) 113/64  SpO2:  [94 %-100 %] 98 %    Wound status: covered   Circulation, motion and sensation: Intact and good dorsiflexion/plantarflexion  Swelling: mild      Pertinent Labs   Lab Results: personally reviewed.   Recent Labs   Lab 12/21/24  2043   INR 1.16*     Recent Labs   Lab 12/22/24  0607   HGB 9.1*     No results for input(s): \"CREATININE\" in the last 168 hours.    Plan:   Continue PT/OT  DVT prophylaxis  Weightbearing status: PWB  Discharge planning: home when pain allows and mobilizing with PT    Report completed by:  Catrachito Dunlap MD   Date: 12/22/2024  Time: 11:46 AM        "

## 2024-12-22 NOTE — ANESTHESIA CARE TRANSFER NOTE
Patient: Richard J Bloch Jr.    Procedure: Procedure(s):  INTERNAL FIXATION, FRACTURE, TROCHANTERIC, HIP, USING PINS OR RODS       Diagnosis: Closed displaced intertrochanteric fracture of femur, unspecified laterality, initial encounter (H) [S72.898A]  Diagnosis Additional Information: No value filed.    Anesthesia Type:   General     Note:    Oropharynx: oropharynx clear of all foreign objects and spontaneously breathing  Level of Consciousness: drowsy  Oxygen Supplementation: face mask  Level of Supplemental Oxygen (L/min / FiO2): 8  Independent Airway: airway patency satisfactory and stable  Dentition: dentition unchanged  Vital Signs Stable: post-procedure vital signs reviewed and stable  Report to RN Given: handoff report given  Patient transferred to: PACU    Handoff Report: Identifed the Patient, Identified the Reponsible Provider, Reviewed the pertinent medical history, Discussed the surgical course, Reviewed Intra-OP anesthesia mangement and issues during anesthesia, Set expectations for post-procedure period and Allowed opportunity for questions and acknowledgement of understanding      Vitals:  Vitals Value Taken Time   /68 12/21/24 1820   Temp 37.1  C (98.7  F) 12/21/24 1820   Pulse 87 12/21/24 1820   Resp 12    SpO2 98 % 12/21/24 1820       Electronically Signed By: VIK Hurt CRNA  December 21, 2024  6:22 PM

## 2024-12-23 ENCOUNTER — APPOINTMENT (OUTPATIENT)
Dept: PHYSICAL THERAPY | Facility: CLINIC | Age: 54
DRG: 481 | End: 2024-12-23
Payer: COMMERCIAL

## 2024-12-23 ENCOUNTER — APPOINTMENT (OUTPATIENT)
Dept: OCCUPATIONAL THERAPY | Facility: CLINIC | Age: 54
DRG: 481 | End: 2024-12-23
Payer: COMMERCIAL

## 2024-12-23 VITALS
DIASTOLIC BLOOD PRESSURE: 63 MMHG | OXYGEN SATURATION: 100 % | WEIGHT: 165 LBS | HEART RATE: 95 BPM | TEMPERATURE: 98.2 F | BODY MASS INDEX: 23.62 KG/M2 | RESPIRATION RATE: 16 BRPM | SYSTOLIC BLOOD PRESSURE: 109 MMHG | HEIGHT: 70 IN

## 2024-12-23 LAB
GLUCOSE SERPL-MCNC: 106 MG/DL (ref 70–99)
HGB BLD-MCNC: 8.2 G/DL (ref 13.3–17.7)
SODIUM SERPL-SCNC: 128 MMOL/L (ref 135–145)

## 2024-12-23 PROCEDURE — 250N000013 HC RX MED GY IP 250 OP 250 PS 637: Performed by: STUDENT IN AN ORGANIZED HEALTH CARE EDUCATION/TRAINING PROGRAM

## 2024-12-23 PROCEDURE — 82947 ASSAY GLUCOSE BLOOD QUANT: CPT | Performed by: FAMILY MEDICINE

## 2024-12-23 PROCEDURE — 99239 HOSP IP/OBS DSCHRG MGMT >30: CPT | Performed by: FAMILY MEDICINE

## 2024-12-23 PROCEDURE — 36415 COLL VENOUS BLD VENIPUNCTURE: CPT | Performed by: STUDENT IN AN ORGANIZED HEALTH CARE EDUCATION/TRAINING PROGRAM

## 2024-12-23 PROCEDURE — 97530 THERAPEUTIC ACTIVITIES: CPT | Mod: GP

## 2024-12-23 PROCEDURE — 97535 SELF CARE MNGMENT TRAINING: CPT | Mod: GO

## 2024-12-23 PROCEDURE — 84295 ASSAY OF SERUM SODIUM: CPT | Performed by: FAMILY MEDICINE

## 2024-12-23 PROCEDURE — 85018 HEMOGLOBIN: CPT | Performed by: STUDENT IN AN ORGANIZED HEALTH CARE EDUCATION/TRAINING PROGRAM

## 2024-12-23 PROCEDURE — 97116 GAIT TRAINING THERAPY: CPT | Mod: GP

## 2024-12-23 RX ORDER — HYDROMORPHONE HYDROCHLORIDE 4 MG/1
4 TABLET ORAL EVERY 4 HOURS PRN
Qty: 30 TABLET | Refills: 0 | Status: SHIPPED | OUTPATIENT
Start: 2024-12-23

## 2024-12-23 RX ORDER — ASPIRIN 81 MG/1
81 TABLET ORAL 2 TIMES DAILY
Qty: 60 TABLET | Refills: 0 | Status: SHIPPED | OUTPATIENT
Start: 2024-12-23

## 2024-12-23 RX ORDER — ACETAMINOPHEN 325 MG/1
975 TABLET ORAL EVERY 8 HOURS
Qty: 100 TABLET | Refills: 0 | Status: SHIPPED | OUTPATIENT
Start: 2024-12-23

## 2024-12-23 RX ORDER — FAMOTIDINE 20 MG/1
20 TABLET, FILM COATED ORAL 2 TIMES DAILY
Qty: 60 TABLET | Refills: 0 | Status: SHIPPED | OUTPATIENT
Start: 2024-12-23

## 2024-12-23 RX ORDER — AMOXICILLIN 250 MG
1 CAPSULE ORAL 2 TIMES DAILY
Qty: 60 TABLET | Refills: 0 | Status: SHIPPED | OUTPATIENT
Start: 2024-12-23

## 2024-12-23 RX ADMIN — ACETAMINOPHEN 975 MG: 325 TABLET ORAL at 05:55

## 2024-12-23 RX ADMIN — ASPIRIN 81 MG: 81 TABLET, COATED ORAL at 10:12

## 2024-12-23 RX ADMIN — HYDROMORPHONE HYDROCHLORIDE 4 MG: 4 TABLET ORAL at 03:50

## 2024-12-23 RX ADMIN — HYDROMORPHONE HYDROCHLORIDE 4 MG: 4 TABLET ORAL at 09:05

## 2024-12-23 RX ADMIN — FAMOTIDINE 20 MG: 20 TABLET, FILM COATED ORAL at 10:01

## 2024-12-23 ASSESSMENT — ACTIVITIES OF DAILY LIVING (ADL)
ADLS_ACUITY_SCORE: 33
ADLS_ACUITY_SCORE: 34
ADLS_ACUITY_SCORE: 33
ADLS_ACUITY_SCORE: 34

## 2024-12-23 NOTE — PLAN OF CARE
Problem: Adult Inpatient Plan of Care  Goal: Optimal Comfort and Wellbeing  Outcome: Progressing  Intervention: Monitor Pain and Promote Comfort  Recent Flowsheet Documentation  Taken 12/22/2024 2132 by Ryne Omer RN  Pain Management Interventions: medication (see MAR)   Problem: Hip Fracture Medical Management  Goal: Baseline Cognitive Function Maintained  Outcome: Progressing  Patient vital signs are at baseline: Yes  Patient able to ambulate as they were prior to admission or with assist devices provided by therapies during their stay:  Yes  Patient MUST void prior to discharge:  Yes, using urinal   Patient able to tolerate oral intake:  Yes, taking oral Dilaudid   Pain has adequate pain control using Oral analgesics:  Yes  Does patient have an identified :  Yes  Has goal D/C date and time been discussed with patient:  Yes

## 2024-12-23 NOTE — PLAN OF CARE
Patient vital signs are at baseline: Yes  Patient able to ambulate as they were prior to admission or with assist devices provided by therapies during their stay:  Yes  Patient MUST void prior to discharge:  Yes  Patient able to tolerate oral intake:  Yes  Pain has adequate pain control using Oral analgesics:  Yes  Does patient have an identified :  Yes  Has goal D/C date and time been discussed with patient:  Yes    Discharge instructions went over with patient and pt's family and pt was given AVS.   Questions were answered and pt stated understanding.  Pt is aware of follow up appointment(s). Pt discharging to home with family transport.

## 2024-12-23 NOTE — PLAN OF CARE
Physical Therapy Discharge Summary    Reason for therapy discharge:    Discharged to home.    Progress towards therapy goal(s). See goals on Care Plan in [facility] electronic health record for goal details.  Goals partially met.  Barriers to achieving goals:   discharge from facility.    Therapy recommendation(s):    Discussed with pt, recommend assist on stairs and mobility initially upon return home, continued use of FWW for ambulation/transfers. Placed order for FWW and crutches (needs for stairs). Pt reports has family who can assist at d/c.

## 2024-12-23 NOTE — PROGRESS NOTES
Occupational Therapy Discharge Summary    Reason for therapy discharge:    All goals and outcomes met, no further needs identified.    Progress towards therapy goal(s). See goals on Care Plan in [facility] electronic health record for goal details.  Goals met    Therapy recommendation(s):    No further therapy is recommended. Pt benefit from commode, shower chair, leg , reacher and LHSH.

## 2024-12-23 NOTE — DISCHARGE SUMMARY
[facility] MEDICINE  DISCHARGE SUMMARY     Primary Care Physician: Naye Faye  Admission Date: 12/21/2024   Discharge Provider: Justin Gonzalez MD Discharge Date: 12/23/2024    Diet:   Active Diet and Nourishment Order   Procedures    Fluid restriction 1500 ML FLUID    Advance Diet as Tolerated: Regular Diet Adult    Discharge Instruction - Regular Diet Adult    Diet     Code Status: Full Code   Activity: DCACTIVITY: Activity as tolerated  Follow activity restrictions per orthopedics.      Condition at Discharge: Stable     REASON FOR PRESENTATION(See Admission Note for Details)   Fall and right leg pain with deformity    PRINCIPAL & ACTIVE DISCHARGE DIAGNOSES     Principal Problem:    Closed displaced intertrochanteric fracture of femur, unspecified laterality, initial encounter (H)  Active Problems:    Gastroesophageal reflux disease without esophagitis    Elevated CK    KAMALA (acute kidney injury) (H)    MARIA TERESA (obstructive sleep apnea)    Hyponatremia  Status post right hip ORIF pinning  Rhabdomyolysis  Acute blood loss anemia  GERD with hiatal hernia  History of Vasquez's  Essential hypertension  Hepatic steatosis  Alcohol overuse    Clinically Significant Risk Factors         # Hyponatremia: Lowest Na = 128 mmol/L in last 2 days, will monitor as appropriate  # Hypochloremia: Lowest Cl = 89 mmol/L in last 2 days, will monitor as appropriate       # Coagulation Defect: INR = 1.16 (Ref range: 0.85 - 1.15) and/or PTT = N/A, will monitor for bleeding  # Thrombocytopenia: Lowest platelets = 105 in last 2 days, will monitor for bleeding   # Hypertension: Noted on problem list                       PENDING LABS     Unresulted Labs Ordered in the Past 30 Days of this Admission       No orders found from 11/21/2024 to 12/22/2024.          PROCEDURES ( this hospitalization only)    Procedure(s):  INTERNAL FIXATION, FRACTURE, TROCHANTERIC, HIP, USING PINS OR RODS      RECOMMENDATIONS  TO OUTPATIENT PROVIDER FOR F/U VISIT     Follow-up Appointments       Follow Up Care      Please follow-up with Dr. López' team in 2 weeks at [facility]. Call our scheduling line at [phone] to make an appointment, if you do not already have one scheduled.        Follow-up and recommended labs and tests       1.  Follow-up with primary care provider in 1 week.  Recheck blood pressure and sodium level.  2.  Follow-up with [facility] per their recommendations.                  DISPOSITION     Home    SUMMARY OF HOSPITAL COURSE:      Richard J Bloch Jr. is a 54 year old male with PMH signficant for MARIA TERESA on CPAP, Vasquez's esophagus/GERD with hiatal hernia, HTN, hypertriglyceridemia, hepatic steatosis and alcohol overuse.  12/21/2024 early a.m. after consuming some alcohol he fell down multiple stairs resulting in significant right leg pain.  Did not come to the ED until 1130 hrs. Sodium 131, creatinine 1.24, CK 1849.  X-ray showed right femoral fracture with displacement.  CT scan head and neck unremarkable.  Admitted.      1.  Orthopedics.  [facility] Dr. López consulted and underwent ORIF of right hip with pinning.  Postoperatively doing well.  Pain under reasonable control.  Follow-up with orthopedics per their recommendations.  Utilize walker.  Discharge hemoglobin 9.1.  No indication for transfusion.    2.  Renal.  Aggressively treated rhabdomyolysis and KAMALA with IV fluids.  Due to the hyponatremia was also placed on a fluid restriction.  Rhabdomyolysis and KAMALA did resolve.  Sodium on day of discharge was 128.  Patient though was doing well asymptomatic.  Likely baseline is 131.  Recommend continuing fluid restriction at discharge as well as avoiding alcohol intake.  PPI was switched to Pepcid due to associated hyponatremia.  Request that patient recheck basic metabolic panel in 1 week with primary care provider.    3.  Cardiovascular.  Postoperatively patient's blood pressure was  running low.  Notes that he has recently had weight loss and has noted since that time that after taking his blood pressure pills he does experience some lightheadedness.  At discharge we will have him hold his Zestoretic and metoprolol.  Recheck blood pressure with primary.    Medically otherwise stable.    Discharge Medications with Med changes:     Current Discharge Medication List        START taking these medications    Details   acetaminophen (TYLENOL) 325 MG tablet Take 3 tablets (975 mg) by mouth every 8 hours.  Qty: 100 tablet, Refills: 0    Associated Diagnoses: Closed displaced intertrochanteric fracture of femur, unspecified laterality, initial encounter (H)      aspirin 81 MG EC tablet Take 1 tablet (81 mg) by mouth 2 times daily.  Qty: 60 tablet, Refills: 0    Associated Diagnoses: Closed displaced intertrochanteric fracture of femur, unspecified laterality, initial encounter (H)      famotidine (PEPCID) 20 MG tablet Take 1 tablet (20 mg) by mouth 2 times daily.  Qty: 60 tablet, Refills: 0    Associated Diagnoses: Gastroesophageal reflux disease without esophagitis      HYDROmorphone (DILAUDID) 4 MG tablet Take 1 tablet (4 mg) by mouth every 4 hours as needed for severe pain.  Qty: 30 tablet, Refills: 0    Associated Diagnoses: Closed displaced intertrochanteric fracture of femur, unspecified laterality, initial encounter (H)      senna-docusate (SENOKOT-S/PERICOLACE) 8.6-50 MG tablet Take 1 tablet by mouth 2 times daily.  Qty: 60 tablet, Refills: 0    Associated Diagnoses: Closed displaced intertrochanteric fracture of femur, unspecified laterality, initial encounter (H)           CONTINUE these medications which have NOT CHANGED    Details   sildenafil (REVATIO) 20 MG tablet Take 3-4 tabs as directed before sexual activity  Qty: 90 tablet, Refills: 3    Associated Diagnoses: Erectile dysfunction, unspecified erectile dysfunction type           STOP taking these medications        lisinopril-hydrochlorothiazide (ZESTORETIC) 20-25 MG tablet Comments:   Reason for Stopping:         metoprolol succinate ER (TOPROL XL) 25 MG 24 hr tablet Comments:   Reason for Stopping:         omeprazole (PRILOSEC) 20 MG DR capsule Comments:   Reason for Stopping:                 Rationale for medication changes:      Pain control.  Omeprazole switched to Pepcid due to hyponatremia.  Zestoretic and metoprolol held due to hypotension.      Consults     ORTHOPEDIC SURGERY IP CONSULT  PHYSICAL THERAPY ADULT IP CONSULT  OCCUPATIONAL THERAPY ADULT IP CONSULT  CARE MANAGEMENT / SOCIAL WORK IP CONSULT      Immunizations given this encounter     Most Recent Immunizations   Administered Date(s) Administered    COVID-19 Bivalent 12+ (Pfizer) 10/21/2022    COVID-19 MONOVALENT 12+ (Pfizer) 10/06/2021    Flu, Unspecified 09/01/2020    HepB, Unspecified 07/25/2007    Influenza (prior to 2024) 10/22/2013    Influenza Vaccine 18-64 (Flublok) 09/27/2023    Influenza Vaccine >6 months,quad, PF 09/16/2021    TD,PF 7+ (Tenivac) 05/01/2018    TDAP (Adacel,Boostrix) 04/02/2024    Td (Adult), Adsorbed 05/01/2005    Td,adult,historic,unspecified 04/12/2006           Anticoagulation Information      Recent INR results:   Recent Labs   Lab 12/21/24 2043   INR 1.16*     Warfarin doses (if applicable) or name of other anticoagulant: Aspirin 81 twice daily per orthopedics      SIGNIFICANT IMAGING FINDINGS     Results for orders placed or performed during the hospital encounter of 12/21/24   Head CT w/o contrast    Impression    IMPRESSION:  HEAD CT:  1.  No CT evidence for acute intracranial process.  2.  Brain atrophy and presumed chronic microvascular ischemic changes as above.    CERVICAL SPINE CT:  1.  No CT evidence for acute fracture or post traumatic subluxation.  2.  Mild cervical spondylosis greatest at C5-C6 as above.   CT Cervical Spine w/o Contrast    Impression    IMPRESSION:  HEAD CT:  1.  No CT evidence for acute  intracranial process.  2.  Brain atrophy and presumed chronic microvascular ischemic changes as above.    CERVICAL SPINE CT:  1.  No CT evidence for acute fracture or post traumatic subluxation.  2.  Mild cervical spondylosis greatest at C5-C6 as above.   XR Pelvis and Hip Right 2 Views    Impression    IMPRESSION: Comminuted intertrochanteric fracture of the right femur with proximal displacement of the distal fracture fragment and mild displacement of the greater and lesser tuberosity fracture fragments.       No results found for this or any previous visit (from the past 4320 hours).    SIGNIFICANT LABORATORY FINDINGS     Most Recent 3 CBC's:  Recent Labs   Lab Test 12/23/24  0650 12/22/24  0607 12/21/24  1217 04/02/24  1346 09/08/21  1455 11/10/20  1411 03/27/18  0935 01/19/18  1436   WBC  --   --  6.4 4.1 6.1 6.2   < > 5.9   HGB 8.2* 9.1* 12.8* 13.8 14.5 17.2   < > 15.8   MCV  --   --   --   --  110* 115*  --  107*   PLT  --   --  105* 84* 120* 119*   < > 202    < > = values in this interval not displayed.     Most Recent 3 BMP's:  Recent Labs   Lab Test 12/23/24  0651 12/23/24  0650 12/22/24  0607 12/21/24  1217 04/02/24  1346   *  --  129* 131* 131*   POTASSIUM  --   --  4.4 3.8 4.0   CHLORIDE  --   --  93* 89* 90*   CO2  --   --  23 24 29   BUN  --   --  20.1* 17.7 16.1   CR  --   --  1.02 1.24* 1.17   ANIONGAP  --   --  13 18* 12   MADDY  --   --  8.5* 9.7 9.4   GLC  --  106* 138* 118* 113*     Most Recent 2 LFT's:  Recent Labs   Lab Test 04/02/24  1346 09/08/21  1455   AST 97* 66*   ALT 59 38   ALKPHOS 93 90   BILITOTAL 1.1 1.4*     Most Recent 3 INR's:  Recent Labs   Lab Test 12/21/24  2043   INR 1.16*     Most Recent TSH and T4:  Recent Labs   Lab Test 12/21/24  1217   TSH 1.16     Most Recent Hemoglobin A1c:No lab results found.  Most Recent 6 glucoses:  Recent Labs   Lab Test 12/23/24  0650 12/22/24  0607 12/21/24  1217 04/02/24  1346 09/08/21  1455 11/10/20  1411   * 138* 118* 113* 104* 104  "    Most Recent Urinalysis:  Recent Labs   Lab Test 02/20/24  1410 09/30/21  0809   COLOR Yellow Yellow   APPEARANCE Clear Clear   URINEGLC Negative Negative   URINEBILI Small* Small*   URINEKETONE Negative Trace*   SG 1.015 1.020   UBLD Negative Trace*   URINEPH 5.5 5.5   PROTEIN Negative 30*   UROBILINOGEN 0.2 0.2   NITRITE Negative Negative   LEUKEST Negative Trace*   RBCU  --  0-2   WBCU  --  0-5     Most Recent ESR & CRP:No lab results found.  No results found for: \"CTROPT\"   7-Day Micro Results       No results found for the last 168 hours.              Discharge Orders        Primary Care - Care Coordination Referral      Reason for your hospital stay    S/P INTERNAL FIXATION, FRACTURE, TROCHANTERIC, HIP, USING PINS OR GIOVANNI     When to call - Contact Surgeon Team    You may experience symptoms that require follow-up before your scheduled appointment. Refer to the \"Stoplight Tool\" for instructions on when to contact your Surgeon Team if you are concerned about pain control, blood clots, constipation, or if you are unable to urinate.     When to call - Reach out to Urgent Care    If you are not able to reach your Surgeon Team and you need immediate care, go to the Orthopedic Walk-in Clinic or Urgent Care at your Surgeon's office.  Do NOT go to the Emergency Room unless you have shortness of breath, chest pain, or other signs of a medical emergency.     When to call - Reasons to Call 911    Call 911 immediately if you experience sudden-onset chest pain, arm weakness/numbness, slurred speech, or shortness of breath     Discharge Instruction - Breathing exercises    Perform breathing exercises using your Incentive Spirometer 10 times per hour while awake for 2 weeks.     Symptoms - Fever Management    A low grade fever can be expected after surgery.  Use acetaminophen (TYLENOL) as needed for fever management.  Contact your Surgeon Team if you have a fever greater than 101.5 F, chills, and/or night sweats. "     Symptoms - Constipation management    Constipation (hard, dry bowel movements) is expected after surgery due to the combination of being less active, the anesthetic, and the opioid pain medication.  You can do the following to help reduce constipation:  ~  FLUIDS:  Drink clear liquids (water or Gatorade), or juice (apple/prune).  ~  DIET:  Eat a fiber rich diet.    ~  ACTIVITY:  Get up and move around several times a day.  Increase your activity as you are able.  MEDICATIONS:  Reduce the risk of constipation by starting medications before you are constipated.  You can take Miralax   (1 packet as directed) and/or a stool softener (Senokot 1-2 tablets 1-2 times a day).  If you already have constipation and these medications are not working, you can get magnesium citrate and use as directed.  If you continue to have constipation you can try an over the counter suppository or enema.  Call your Surgeon Team if it has been greater than 3 days since your last bowel movement.     Symptoms - Reduced Urine Output    Changes in the amount of fluids you drank before and after surgery may result in problems urinating.  It is important to stay well-hydrated after surgery and drink plenty of water. If it has been greater than 8 hours since you have urinated despite drinking plenty of water, call your Surgeon Team.     Activity - Exercises to prevent blood clots    Unless otherwise directed by your Surgeon team, perform the following exercises at least three times per day for the first four weeks after surgery to prevent blood clots in your legs: 1) Point and flex your feet (Ankle Pumps), 2) Move your ankle around in big circles, 3) Wiggle your toes, 4) Walk, even for short distances, several times a day, will help decrease the risk of blood clots.     Comfort and Pain Management - Pain after Surgery    Pain after surgery is normal and expected.  You will have some amount of pain for several weeks after surgery.  Your pain will  "improve with time.  There are several things you can do to help reduce your pain including: rest, ice, elevation, and using pain medications as needed. Contact your Surgeon Team if you have pain that persists or worsens after surgery despite rest, ice, elevation, and taking your medication(s) as prescribed. Contact your Surgeon Team if you have new numbness, tingling, or weakness in your operative extremity.     Comfort and Pain Management - Swelling after Surgery    Swelling and/or bruising of the surgical extremity is common and may persist for several months after surgery. In addition to frequent icing and elevation, gentle compressive support with an ACE wrap or tubigrip may help with swelling. Apply compression regularly, removing at least twice daily to perform skin checks. Contact your Surgeon Team if your swelling increases and is NOT associated with an increase in your activity level, or if your swelling increases and is associated with redness and pain.     Comfort and Pain Management - LOWER Extremity Elevation    Swelling is expected for several months after surgery. This type of swelling is usually associated with gravity and activity, and can be improved with elevation.   The best way to do this is to get your \"toes above your nose\" by laying down and placing several pillows lengthwise under your calf and heel. When elevating your leg keep your knee completely straight. Perform this elevation as often as possible especially for the first two weeks after surgery.     Comfort and Pain Management - Cold therapy    Ice can be used to control swelling and discomfort after surgery. Place a thin towel over your operative site and apply the ice pack overtop. Leave ice pack in place for 20 minutes, then remove for 20 minutes. Repeat this 20 minutes on/20 minutes off routine as often as tolerated.     Medication Instructions - Acetaminophen (TYLENOL) Instructions    You were discharged with acetaminophen " (TYLENOL) for pain management after surgery. Acetaminophen most effectively manages pain symptoms when it is taken on a schedule without missing doses (every four, six, or eight hours). Your Provider will prescribe a safe daily dose between 3000 - 4000 mg.  Do NOT exceed this daily dose. Most patients use acetaminophen for pain control for the first four weeks after surgery.  You can wean from this medication as your pain decreases.     Follow Up Care    Please follow-up with Dr. López' team in 2 weeks at [facility]. Call our scheduling line at [phone] to make an appointment, if you do not already have one scheduled.     Activity    Activity as tolerated     Return to Driving    Return to driving - Driving is NOT permitted until directed by your provider. Under no circumstance are you permitted to drive while using narcotic pain medications.     Dressing / Wound Care - Wound    You have a clean dressing on your surgical wound. Dressing change instructions as follows: dressing will be removed at your follow-up appointment. Contact your Surgeon Team if you have increased redness, warmth around the surgical wound, and/or drainage from the surgical wound.     Dressing / Wound care - Shower with wound/dressing covered    You must COVER your dressing or incision with saran wrap (or any other non-permeable covering) to allow the incision to remain dry while showering.  You may shower 2 days after surgery as long as the surgical wound stays dry. Continue to cover your dressing or incision for showering until your first office visit.  You are strictly prohibited from soaking or submerging the surgical wound underwater.     Dressing / Wound Care - NO Tub Bathing    Tub bathing, swimming, or any other activities that will cause your incision to be submerged in water should be avoided until allowed by your Surgeon.     Medication instructions -  Anticoagulation - aspirin    Take the aspirin as prescribed for a  total of four weeks after surgery.  This is given to help minimize your risk of blood clot.     Opioid Instructions (Less than 65 years)    You were discharged with an opioid medication (hydromorphone, oxycodone, hydrocodone, or tramadol). This medication should only be taken for breakthrough pain that is not controlled with acetaminophen (TYLENOL). If you rate your pain less than 3 you do not need this medication. Pain rating 0-3: You do not need this medication. Pain rating 4-6: Take 1 tablet every 4-6 hours as needed Pain rating 7-10: Take 2 tablets every 4-6 hours as needed. Do not exceed 6 tablets per day     Medication Instructions - Opioids - Tapering Instructions    In the first three days following surgery, your symptoms may warrant use of the narcotic pain medication every four to six hours as prescribed. This is normal. As your pain symptoms improve, focus your efforts on decreasing (tapering) use of narcotic medications. The most successful tapering strategy is to first, decrease the number of tablets you take every 4-6 hours to the minimum prescribed. Then, increase the amount of time between doses. For example: First, taper to   or 1 tablet every 4-6 hours. Then, taper to   or 1 tablet every 6-8 hours. Then, taper to   or 1 tablet every 8-10 hours. Then, taper to   or 1 tablet every 10-12 hours. Then, taper to   or 1 tablet at bedtime. The bedtime dose can help with comfort during sleep and is typically the last dose to be discontinued after surgery.     No precautions    No precautions directed by your Provider.  You may perform range of motion activities as tolerated.     Foot flat weight bearing    Foot flat weight bearing.     Reason for your hospital stay    Fall with right femur fracture, low sodium and low blood pressure     Follow-up and recommended labs and tests     1.  Follow-up with primary care provider in 1 week.  Recheck blood pressure and sodium level.  2.  Follow-up with [facility] per their recommendations.     Activity    Your activity upon discharge: activity as tolerated and ambulate with walker.  Follow orthopedic recommendations.     When to contact your care team    Call [facility] if you have any of the following: temperature greater than 100.5, increased drainage, increased swelling, or increased pain.     Crutches DME    DME Documentation: Describe the reason for need to support medical necessity: Impaired gait status post hip surgery. I, the undersigned, certify that the above prescribed supplies are medically necessary for this patient and is both reasonable and necessary in reference to accepted standards of medical practice in the treatment of this patient's condition and is not prescribed as a convenience.     Cane DME    DME Documentation: Describe the reason for need to support medical necessity: Impaired gait status post hip surgery. I, the undersigned, certify that the above prescribed supplies are medically necessary for this patient and is both reasonable and necessary in reference to accepted standards of medical practice in the treatment of this patient's condition and is not prescribed as a convenience.     Walker DME    DME Documentation: Describe the reason for need to support medical necessity: Impaired gait status post hip surgery. I, the undersigned, certify that the above prescribed supplies are medically necessary for this patient and is both reasonable and necessary in reference to accepted standards of medical practice in the treatment of this patient's condition and is not prescribed as a convenience.     Discharge Instruction - Regular Diet Adult    Return to your pre-surgery diet unless instructed otherwise     Diet    Follow this diet upon discharge: Current Diet:Orders Placed This Encounter      Fluid restriction 1500 ML FLUID      Advance Diet as Tolerated: Regular Diet Adult      Discharge Instruction - Regular Diet Adult.  Abstain  from alcohol use while taking pain medications.  Afterwards recommend moderate use only.       Examination   Physical Exam   Temp:  [98.2  F (36.8  C)-99.4  F (37.4  C)] 98.2  F (36.8  C)  Pulse:  [] 95  Resp:  [16] 16  BP: (107-112)/(59-66) 109/63  SpO2:  [100 %] 100 %  Wt Readings from Last 4 Encounters:   12/21/24 74.8 kg (165 lb)   04/02/24 82.1 kg (181 lb)   02/28/23 89.8 kg (198 lb)   05/03/22 85.7 kg (189 lb)       Constitutional: awake, alert, cooperative, no apparent distress, and appears stated age  Eyes: Lids and lashes normal, pupils equal, round and reactive to light, extra ocular muscles intact, sclera clear, conjunctiva normal  ENT: Normocephalic, without obvious abnormality, atraumatic, sinuses nontender on palpation, external ears without lesions, oral pharynx with moist mucous membranes, tonsils without erythema or exudates, gums normal and good dentition.  Respiratory: No increased work of breathing, good air exchange, clear to auscultation bilaterally, no crackles or wheezing  Cardiovascular: Normal apical impulse, regular rate and rhythm, normal S1 and S2, no S3 or S4, and no murmur noted  GI: No scars, normal bowel sounds, soft, non-distended, non-tender, no masses palpated, no hepatosplenomegally  Skin: normal skin color, texture, turgor, no redness, warmth, or swelling, and no rashes  Musculoskeletal: Limited exam due to postoperative status but good strength at the ankles and feet bilaterally. no lower extremity pitting edema present  Neurologic: Cranial nerves II-XII are grossly intact. Sensory:  Sensory intact  Neuropsychiatric: General: normal, calm, and normal eye contact Level of consciousness: alert / normal Affect: normal Orientation: oriented to self, place, time and situation Memory and insight: normal, memory for past and recent events intact, and thought process normal      Please see EMR for more detailed significant labs, imaging, consultant notes etc.    Justin MARSHALL  MD Lisa, personally saw the patient today and spent greater than 30 minutes discharging this patient.    Justin Gonzalez MD  [facility]    CC:Naye Faye

## 2024-12-23 NOTE — PROGRESS NOTES
"Orthopedic Progress Note      Assessment: 2 Days Post-Op  S/P Procedure(s):  INTERNAL FIXATION, FRACTURE, TROCHANTERIC, HIP, USING PINS OR RODS @    Plan:   Pain Control: Continue per pain protocol.  Weight Bearing: Foot flat weightbearing with a walker x 6 weeks  DVT Prophylaxis: ASA 81 mg BID  Antibiotics: 24 hours of perioperative antibiotics  GI: Plan for aggressive bowel regimen to prevent constipation from narcotic medications  Lines: HLIV once tolerating PO  PT/OT: Eval and treatment. Will follow up on recommendations.  Follow up:  Please follow-up with Dr. López' team in 2 weeks at [facility]. Call our scheduling line at [phone] to make an appointment, if you do not already have one scheduled.    Discharge plan: To home with assist      Subjective:  Pain: mild  Chest pain, SOB: no   Nausea, Vomiting:  no  Lightheadedness, Dizziness:  no  Neuro:  Patient denies new onset numbness or paresthesias    Patient is doing well on POD2. He just worked with physical therapy which he said went well, but he is feeling more sore afterwards. He complains of some pain over the incision site as well as groin pain. I discussed the groin pain could be due to positioning during the surgery, and that it is okay for him to ice both areas. He is feeling ready to go home.    Objective:  /59 (BP Location: Right arm)   Pulse 100   Temp 98.2  F (36.8  C) (Oral)   Resp 16   Ht 1.778 m (5' 10\")   Wt 74.8 kg (165 lb)   SpO2 100%   BMI 23.68 kg/m    The patient is A&Ox3. Appears comfortable.   Sensation is intact.  Dorsiflexion and plantar flexion is intact.  Dorsalis pedis pulse intact.  Calves are soft and non-tender. Negative Ryne's.  The incision is covered. Dressing C/D/I.    Pertinent Labs   Lab Results: personally reviewed.   Lab Results   Component Value Date    INR 1.16 (H) 12/21/2024     Lab Results   Component Value Date    WBC 6.4 12/21/2024    HGB 8.2 (L) 12/23/2024    HCT  12/21/2024      " Comment:      Unable to assay due to interfering substance.    MCV  12/21/2024      Comment:      Unable to assay due to interfering substance.     (L) 12/21/2024     Lab Results   Component Value Date     (L) 12/23/2024    CO2 23 12/22/2024         Report completed by:  Mariposa Strauss PA-C/[facility]    Date: 12/23/2024  Time: 10:04 AM

## 2024-12-24 ENCOUNTER — PATIENT OUTREACH (OUTPATIENT)
Dept: CARE COORDINATION | Facility: CLINIC | Age: 54
End: 2024-12-24
Payer: COMMERCIAL

## 2024-12-24 NOTE — PROGRESS NOTES
Clinic Care Coordination Contact  Transitions of Care Outreach  Chief Complaint   Patient presents with    Clinic Care Coordination - Post Hospital       Most Recent Admission Date: 12/21/2024   Most Recent Admission Diagnosis: Elevated CK - R74.8  KAMALA (acute kidney injury) (H) - N17.9  Closed displaced intertrochanteric fracture of femur, unspecified laterality, initial encounter (H) - S72.143A     Most Recent Discharge Date: 12/23/2024   Most Recent Discharge Diagnosis: Closed displaced intertrochanteric fracture of femur, unspecified laterality, initial encounter (H) - S72.143A  Elevated CK - R74.8  KAMALA (acute kidney injury) (H) - N17.9  Gastroesophageal reflux disease without esophagitis - K21.9     Transitions of Care Assessment    Discharge Assessment  How are you doing now that you are home?: doing well - happy to be home; trying to move as much as possible but not pushing too hard - resting and icing; feeling good. Has help at home; all medications are orginized; has all follow-up appointments made : [facility]  How are your symptoms? (Red Flag symptoms escalate to triage hotline per guidelines): Improved  Do you know how to contact your clinic care team if you have future questions or changes to your health status? : Yes  Does the patient have their discharge instructions? : Yes  Does the patient have questions regarding their discharge instructions? : No  Were you started on any new medications or were there changes to any of your previous medications? : Yes  Does the patient have all of their medications?: Yes  Do you have questions regarding any of your medications? : No  Do you have all of your needed medical supplies or equipment (DME)?  (i.e. oxygen tank, CPAP, cane, etc.): Yes         Post-op (Clinicians Only)  Fever: No  Chills: No  Eating & Drinking: eating and drinking without complaints/concerns        Follow up Plan     Discharge Follow-Up  Discharge follow up appointment scheduled in  alignment with recommended follow up timeframe or Transitions of Risk Category? (Low = within 30 days; Moderate= within 14 days; High= within 7 days): Yes  Discharge Follow Up Appointment Date: 12/27/24  Discharge Follow Up Appointment Scheduled with?: Primary Care Provider    Future Appointments   Date Time Provider Department Center   12/27/2024 11:00 AM John Colbert MD [id number]       Outpatient Plan as outlined on AVS reviewed with patient.    For any urgent concerns, please contact our 24 hour nurse triage line: [phone] ([phone])       JOSE LUIS Hines

## 2025-01-03 ENCOUNTER — TRANSFERRED RECORDS (OUTPATIENT)
Dept: HEALTH INFORMATION MANAGEMENT | Facility: CLINIC | Age: 55
End: 2025-01-03
Payer: COMMERCIAL

## 2025-01-09 ENCOUNTER — TELEPHONE (OUTPATIENT)
Dept: FAMILY MEDICINE | Facility: CLINIC | Age: 55
End: 2025-01-09
Payer: COMMERCIAL

## 2025-01-09 NOTE — TELEPHONE ENCOUNTER
Forms/Letter Request    Type of form/letter: Disability      Is Release of Information needed?: Yes  Was an MADY obtained?  No - writer could not locate an MADY under registration or media tabs    Do we have the form/letter: Yes: in [place] folder    Who is the form from? Insurance comp    Where did/will the form come from? form was faxed in    When is form/letter needed by: within 3 business days (01/12/25)

## 2025-01-13 ENCOUNTER — TELEPHONE (OUTPATIENT)
Dept: FAMILY MEDICINE | Facility: CLINIC | Age: 55
End: 2025-01-13
Payer: COMMERCIAL

## 2025-01-13 NOTE — TELEPHONE ENCOUNTER
1-13-25    Forms/Letter Request    Type of form/letter: OTHER: [employer] return to work form        Do we have the form/letter: Yes: in [place] folder    Who is the form from? [employer]     Where did/will the form come from? form was faxed in    When is form/letter needed by: no due date on form

## 2025-01-14 NOTE — TELEPHONE ENCOUNTER
Please call patient and discussed this with him:    He is being seen by Orthopedics. They might have a clearer direction on when he can go back to work and can do paperwork. Has he discussed this with them? I can help but may not know the specifics regarding healing time, pressure on or off the area, etc like the Ortho provider would.     I will try to help if needed.     Heidi

## 2025-01-14 NOTE — TELEPHONE ENCOUNTER
Duplicate phone note. Please see other phone note, recommended scheduling an appointment to complete specifics on paperwork.    John Colbert MD

## 2025-01-14 NOTE — TELEPHONE ENCOUNTER
Reviewed paperwork for disability.  There are several questions that I cannot answer without discussing with patient on when he should return to work and if he should return part time or full time based on our visit 2.5 weeks ago.  Please have him schedule a follow-up appointment with me or his PCP to complete paperwork.    John Colbert MD

## 2025-01-14 NOTE — TELEPHONE ENCOUNTER
"01/14/25  Spoke with pt to clarify., He states he is currently seeing orthopedics and they have already signed off on all his paperwork. Writer asked pt if he needed the upcoming appt with Neyda to fill out the paperwork and he stated \"No.\"     Forms shredded.  Michelle  "

## 2025-01-31 ENCOUNTER — TRANSFERRED RECORDS (OUTPATIENT)
Dept: HEALTH INFORMATION MANAGEMENT | Facility: CLINIC | Age: 55
End: 2025-01-31
Payer: COMMERCIAL

## 2025-02-17 ENCOUNTER — OFFICE VISIT (OUTPATIENT)
Dept: FAMILY MEDICINE | Facility: CLINIC | Age: 55
End: 2025-02-17
Payer: COMMERCIAL

## 2025-02-17 VITALS
SYSTOLIC BLOOD PRESSURE: 105 MMHG | HEIGHT: 70 IN | OXYGEN SATURATION: 100 % | HEART RATE: 82 BPM | BODY MASS INDEX: 25.2 KG/M2 | TEMPERATURE: 97.5 F | DIASTOLIC BLOOD PRESSURE: 71 MMHG | WEIGHT: 176 LBS

## 2025-02-17 DIAGNOSIS — E87.1 HYPONATREMIA: ICD-10-CM

## 2025-02-17 DIAGNOSIS — Z00.00 VISIT FOR PREVENTIVE HEALTH EXAMINATION: Primary | ICD-10-CM

## 2025-02-17 DIAGNOSIS — R74.8 ELEVATED CK: ICD-10-CM

## 2025-02-17 DIAGNOSIS — D69.6 LOW PLATELET COUNT: ICD-10-CM

## 2025-02-17 DIAGNOSIS — I10 ESSENTIAL HYPERTENSION WITH GOAL BLOOD PRESSURE LESS THAN 140/90: ICD-10-CM

## 2025-02-17 LAB
ANION GAP SERPL CALCULATED.3IONS-SCNC: 14 MMOL/L (ref 7–15)
BUN SERPL-MCNC: 28.9 MG/DL (ref 6–20)
CALCIUM SERPL-MCNC: 10.1 MG/DL (ref 8.8–10.4)
CHLORIDE SERPL-SCNC: 99 MMOL/L (ref 98–107)
CK SERPL-CCNC: 23 U/L (ref 39–308)
CREAT SERPL-MCNC: 1.18 MG/DL (ref 0.67–1.17)
EGFRCR SERPLBLD CKD-EPI 2021: 74 ML/MIN/1.73M2
ERYTHROCYTE [DISTWIDTH] IN BLOOD BY AUTOMATED COUNT: 12.9 % (ref 10–15)
GLUCOSE SERPL-MCNC: 96 MG/DL (ref 70–99)
HCO3 SERPL-SCNC: 23 MMOL/L (ref 22–29)
HCT VFR BLD AUTO: 32.7 % (ref 40–53)
HGB BLD-MCNC: 11.5 G/DL (ref 13.3–17.7)
MCH RBC QN AUTO: 32.8 PG (ref 26.5–33)
MCHC RBC AUTO-ENTMCNC: 35.2 G/DL (ref 31.5–36.5)
MCV RBC AUTO: 93 FL (ref 78–100)
PLATELET # BLD AUTO: 136 10E3/UL (ref 150–450)
POTASSIUM SERPL-SCNC: 4.6 MMOL/L (ref 3.4–5.3)
RBC # BLD AUTO: 3.51 10E6/UL (ref 4.4–5.9)
SODIUM SERPL-SCNC: 136 MMOL/L (ref 135–145)
WBC # BLD AUTO: 6.7 10E3/UL (ref 4–11)

## 2025-02-17 PROCEDURE — 90677 PCV20 VACCINE IM: CPT | Performed by: NURSE PRACTITIONER

## 2025-02-17 PROCEDURE — 99396 PREV VISIT EST AGE 40-64: CPT | Mod: 25 | Performed by: NURSE PRACTITIONER

## 2025-02-17 PROCEDURE — 90471 IMMUNIZATION ADMIN: CPT | Performed by: NURSE PRACTITIONER

## 2025-02-17 PROCEDURE — 99214 OFFICE O/P EST MOD 30 MIN: CPT | Mod: 25 | Performed by: NURSE PRACTITIONER

## 2025-02-17 PROCEDURE — G2211 COMPLEX E/M VISIT ADD ON: HCPCS | Performed by: NURSE PRACTITIONER

## 2025-02-17 PROCEDURE — 80048 BASIC METABOLIC PNL TOTAL CA: CPT | Performed by: NURSE PRACTITIONER

## 2025-02-17 PROCEDURE — 36415 COLL VENOUS BLD VENIPUNCTURE: CPT | Performed by: NURSE PRACTITIONER

## 2025-02-17 PROCEDURE — 85027 COMPLETE CBC AUTOMATED: CPT | Performed by: NURSE PRACTITIONER

## 2025-02-17 PROCEDURE — 82550 ASSAY OF CK (CPK): CPT | Performed by: NURSE PRACTITIONER

## 2025-02-17 SDOH — HEALTH STABILITY: PHYSICAL HEALTH: ON AVERAGE, HOW MANY DAYS PER WEEK DO YOU ENGAGE IN MODERATE TO STRENUOUS EXERCISE (LIKE A BRISK WALK)?: 0 DAYS

## 2025-02-17 ASSESSMENT — SOCIAL DETERMINANTS OF HEALTH (SDOH): HOW OFTEN DO YOU GET TOGETHER WITH FRIENDS OR RELATIVES?: ONCE A WEEK

## 2025-02-17 NOTE — PROGRESS NOTES
Preventive Care Visit  [facility]  VIK Owens CNP, Family Medicine  Feb 17, 2025      Assessment & Plan     Essential hypertension with goal blood pressure less than 140/90  **  - Basic metabolic panel  - Basic metabolic panel    Hyponatremia  **  - Basic metabolic panel  - Basic metabolic panel    Elevated CK  **  - CK total  - CK total    Visit for preventive health examination  **    Low platelet count  **  - CBC with platelets    The longitudinal plan of care for the diagnosis(es)/condition(s) as documented were addressed during this visit. Due to the added complexity in care, I will continue to support Jose in the subsequent management and with ongoing continuity of care.    Your sodium level has improved, yay! Your Urea Nitrogen is elevated, probably indicating you are not staying hydrated. If you could try to drink more water throughout the day, this should decrease.     Other labs are stable. HGB slowly improving.    -Previous chart note reviewed.  Previous labs reviewed.  Previous hospital admission notes reviewed.  He restarted his blood pressure medications.  Blood pressure is within a normal range.  No history of coronary artery disease, heart attack, or stroke.  Suggested that he cut metoprolol dose in half for the next 1 week, and then stop the medication.  I do not believe according to guidelines, or based on his blood pressure today that he needs this medication on a regular basis.  This medication was originally ordered many years ago for high blood pressure.   -He restarted Zestoretic on his own.  Leg swelling is now improved.  Will check BMP and lab is normal. Zestoretic reordered.   -He has stopped drinking alcohol.  This in itself may have helped his low sodium levels.  Will reassess.   I praised him for not drinking alcohol anymore.  He is looking to getting back to work.  He will continue to follow orthopedics as needed.     CK will get checked, I am  assuming that is now within normal range.  He is not on a statin.     Patient has been advised of split billing requirements and indicates understanding: Yes    Counseling  Appropriate preventive services were addressed with this patient via screening, questionnaire, or discussion as appropriate for fall prevention, nutrition, physical activity, Tobacco-use cessation, social engagement, weight loss and cognition.  Checklist reviewing preventive services available has been given to the patient.  Reviewed patient's diet, addressing concerns and/or questions.   He is at risk for psychosocial distress and has been provided with information to reduce risk.       Keagan Garcia is a 54 year old, presenting for the following:  Physical    - fractured his femur this past December. He was drinking ETOH at the time of the fall. He has been following Orthopedics. He is feeling better, still limping, but no crutches now. Has stopped all alcohol consumption since the incident and has been feeling good. He restarted Zestoretic and Metoprolol because he noticed more leg swelling. He went to his GI doctor, and was also restarted back on PPI. He will get throbbing leg pain at night, so will take a pain pill. He is also still taking naproxen BID. 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   - does not drink excessive amount of coffee or other fluids.          2/17/2025     1:13 PM   Additional Questions   Roomed by Dario MILLER          Health Care Directive  Patient does not have a Health Care Directive: Discussed advance care planning with patient; however, patient declined at this time.      2/17/2025   General Health   How would you rate your overall physical health? Good   Feel stress (tense, anxious, or unable to sleep) To some extent   (!) STRESS CONCERN      2/17/2025   Nutrition   Three or more servings of calcium each day? Yes   Diet: Regular (no restrictions)   How many servings of fruit  and vegetables per day? (!) 2-3   How many sweetened beverages each day? (!) 2         2/17/2025   Exercise   Days per week of moderate/strenous exercise 0 days   (!) EXERCISE CONCERN      2/17/2025   Social Factors   Frequency of gathering with friends or relatives Once a week   Worry food won't last until get money to buy more No   Food not last or not have enough money for food? No   Do you have housing? (Housing is defined as stable permanent housing and does not include staying ouside in a car, in a tent, in an abandoned building, in an overnight shelter, or couch-surfing.) Yes   Are you worried about losing your housing? No   Lack of transportation? No   Unable to get utilities (heat,electricity)? No         2/17/2025   Fall Risk   Fallen 2 or more times in the past year? No   Trouble with walking or balance? Yes   Gait Speed Test (Document in seconds) 2.86   Gait Speed Test Interpretation Less than or equal to 5.00 seconds - PASS          2/17/2025   Dental   Dentist two times every year? Yes         4/2/2024   TB Screening   Were you born outside of the [place]? No         Today's PHQ-2 Score:       2/17/2025     1:09 PM   PHQ-2 ( 1999 [employer])   Q1: Little interest or pleasure in doing things 0   Q2: Feeling down, depressed or hopeless 0   PHQ-2 Score 0    Q1: Little interest or pleasure in doing things Not at all   Q2: Feeling down, depressed or hopeless Not at all   PHQ-2 Score 0       Patient-reported           2/17/2025   Substance Use   Alcohol more than 3/day or more than 7/wk Not Applicable   Do you use any other substances recreationally? No     Social History     Tobacco Use    Smoking status: Never     Passive exposure: Never    Smokeless tobacco: Never   Vaping Use    Vaping status: Never Used   Substance Use Topics    Alcohol use: Yes     Comment: 5-10 friday, saturday, no sundays.    Drug use: Not Currently           2/17/2025   STI Screening   New sexual partner(s) since last STI/HIV test? No  "  ASCVD Risk   The 10-year ASCVD risk score (Shailesh GONZALES, et al., 2019) is: 1.7%    Values used to calculate the score:      Age: 54 years      Sex: Male      Is Non- : No      Diabetic: No      Tobacco smoker: No      Systolic Blood Pressure: 105 mmHg      Is BP treated: No      HDL Cholesterol: 63 mg/dL      Total Cholesterol: 153 mg/dL           Reviewed and updated as needed this visit by Provider                    Past Medical History:   Diagnosis Date    Vasquez's esophagus     Gastroesophageal reflux disease without esophagitis 09/21/2016    Hepatic steatosis 2018    Not on ultrasound    Hypertriglyceridemia     Obstructive sleep apnea syndrome      Past Surgical History:   Procedure Laterality Date    EGD      OPEN REDUCTION INTERNAL FIXATION HIP NAILING Right 12/21/2024    Procedure: INTERNAL FIXATION, FRACTURE, TROCHANTERIC, RIGHT HIP, USING PINS;  Surgeon: Ruben López MD;  Location: [facility] OR    TONSILLECTOMY & ADENOIDECTOMY       Labs reviewed in EPIC      Review of Systems  Constitutional, HEENT, cardiovascular, pulmonary, gi and gu systems are negative, except as otherwise noted.     Objective    Exam  /71   Pulse 82   Temp 97.5  F (36.4  C) (Oral)   Ht 1.778 m (5' 10\")   Wt 79.8 kg (176 lb)   SpO2 100%   PF (!) 12 L/min   BMI 25.25 kg/m     Estimated body mass index is 25.25 kg/m  as calculated from the following:    Height as of this encounter: 1.778 m (5' 10\").    Weight as of this encounter: 79.8 kg (176 lb).    Physical Exam  GENERAL: alert and no distress  EYES: Eyes grossly normal to inspection, PERRL and conjunctivae and sclerae normal  RESP: lungs clear to auscultation - no rales, rhonchi or wheezes  CV: regular rate and rhythm, normal S1 S2, no S3 or S4, no murmur, click or rub, no peripheral edema  MS: no gross musculoskeletal defects noted, no edema  SKIN: no suspicious lesions or rashes  NEURO: Normal strength and tone, mentation " intact and speech normal  PSYCH: mentation appears normal, affect normal/bright        Signed Electronically by: VIK Owens CNP

## 2025-02-18 RX ORDER — LISINOPRIL AND HYDROCHLOROTHIAZIDE 20; 25 MG/1; MG/1
TABLET ORAL
Qty: 90 TABLET | Refills: 1 | Status: SHIPPED | OUTPATIENT
Start: 2025-02-18

## 2025-02-18 NOTE — RESULT ENCOUNTER NOTE
Spike Garcia    It was good to see you again! I am glad everything is improving for you!    Your sodium level has improved, yay! Your Urea Nitrogen is elevated, probably indicating you are not staying hydrated. If you could try to drink more water throughout the day, this should decrease.     Other labs are stable. HGB slowly improving.     Heidi

## 2025-03-03 ENCOUNTER — PATIENT OUTREACH (OUTPATIENT)
Dept: CARE COORDINATION | Facility: CLINIC | Age: 55
End: 2025-03-03
Payer: COMMERCIAL

## 2025-03-07 ENCOUNTER — TRANSFERRED RECORDS (OUTPATIENT)
Dept: HEALTH INFORMATION MANAGEMENT | Facility: CLINIC | Age: 55
End: 2025-03-07
Payer: COMMERCIAL

## 2025-03-17 ENCOUNTER — PATIENT OUTREACH (OUTPATIENT)
Dept: CARE COORDINATION | Facility: CLINIC | Age: 55
End: 2025-03-17
Payer: COMMERCIAL

## 2025-04-04 ENCOUNTER — TRANSFERRED RECORDS (OUTPATIENT)
Dept: HEALTH INFORMATION MANAGEMENT | Facility: CLINIC | Age: 55
End: 2025-04-04
Payer: COMMERCIAL

## 2025-05-17 ENCOUNTER — HEALTH MAINTENANCE LETTER (OUTPATIENT)
Age: 55
End: 2025-05-17

## 2025-06-23 ENCOUNTER — TRANSFERRED RECORDS (OUTPATIENT)
Dept: ADMINISTRATIVE | Facility: CLINIC | Age: 55
End: 2025-06-23
Payer: COMMERCIAL

## 2025-06-23 NOTE — PROCEDURES
[facility]   [id number] [place] [address], [address]  [place] [address]    Patient Name: Richard Bloch Jr Gender: Male  Exam Date: 2025 Visit Number: [id number]  Age: 55 Years 3 Months : 1970  Attending MD: Jack Ponce MD Medical Record #: [id number]  ______________________________________________________________________________________________  Procedure:    Upper GI Endoscopy   Indications:    Reflux   Vasquez's, previous metaplasia; previously ablated using RFA   Eosinophilic Esophagitis - on 20 mg Omeprazole once dally  Provider:        Jack Ponce MD   Referring MD: Referral Self   Primary MD:      Naye Faye CNP  Medications:  Admitting Medication:   0.9% Normal Saline at [facility]   Intra Procedure Medications:   Patient received monitored anesthesia care.     Complications: No immediate complications  ______________________________________________________________________________________________  Procedure:   An examination of the heart and lungs was performed within acceptable limits.  The patient was therefore deemed a reasonable candidate for endoscopy and monitored anesthesia care.  The risks, benefits and plan were discussed to the patient and/or patient representative and all questions answered. After obtaining informed consent, the patient received monitored anesthesia care and I passed the scope.   Throughout the procedure the patient's blood pressure, pulse and oxygen saturations were monitored.  The scope was introduced through the mouth and advanced to the second portion of duodenum.         Findings:   Esophagus:    The z-line is 37 centimeters from the incisors.  Top of the gastric folds is 37 centimeters from the incisors.  *Esophagus Comments:  Normal esophagus.  There was no visible Vasquez's, no esophagitis, and no rings, exudate or fissures seen.  Random biopsies were taken from the level of the GEJ and mid/distal esophagus.  Stomach:    The diaphragm hiatus is  at 38 centimeters from the incisors.  *Stomach Comments:  There was a hiatal hernia identified.  There were 3-4 large hemorrhagic polyps in the proximal stomach, each approximately 1 cm in length or diameter.  These were biopsied.  Duodenum:    Normal duodenum.    Impression:   No visible Vasquez's esophagus; biopsied; previously without dysplasia  Gastroesophageal reflux disease without esophagitis  Eosinophilic esophagitis; biopsied  Hiatal hernia  Likely hyperplastic polyps; biopsied      Plan:  Check path  Consider polyp resection      Electronically Signed                                                             Jack Ponce MD   06/23/2025 2:30 PM     Medications:  Medication Dose Sig Description PRN Status PRN Reason Comments   lisinopril-hydrochlorothiazide 20 mg-25 mg Tab 20 mg-25 mg Take one tablet by mouth daily N  taking as directed   metoprolol succinate ER 50 mg tablet,extended release 24 hr 50 mg take 1 tablet by ORAL route  every day N  taking as directed   omeprazole 20 mg capsule,delayed release 20 mg take 1 capsule by oral route  every day 30 minutes to 1 hour before a meal N  taking as directed     Allergies:  Medication Name Ingredient Reaction Comment    NO KNOWN ALLERGIES       Vital Signs:  Date Time Systolic Diastolic Height Weight BMI   06/23/2025 1:52  72 69 in 176.00 26.00     Smoking Status:    Use Status Type Smoking Status Usage Per Day Years Used Total Pack Years   yes  Former smoker      Race:  White  Ethnicity:  Not  or   Preferred Language:  English      cc: Naye Faye CNP        [facility] [phone]

## 2025-08-11 ENCOUNTER — PATIENT OUTREACH (OUTPATIENT)
Dept: CARE COORDINATION | Facility: CLINIC | Age: 55
End: 2025-08-11
Payer: COMMERCIAL

## 2025-08-13 ENCOUNTER — PATIENT OUTREACH (OUTPATIENT)
Dept: CARE COORDINATION | Facility: CLINIC | Age: 55
End: 2025-08-13
Payer: COMMERCIAL

## (undated) DEVICE — ROD RM 950MM 3MM 3.8MM BALL TIP STRL

## (undated) DEVICE — DRESSING FOAM MEPILEX BORDER AG 12X4 POSTOP 498600

## (undated) DEVICE — CUSTOM PACK GEN MAJOR SBA5BGMHEA

## (undated) DEVICE — DRILL BIT QUICK COUPLING 3 FLUTE 4.2MMX330/100MM CALIBRATE

## (undated) DEVICE — GLOVE BIOGEL PI INDICATOR 8.0 LF 41680

## (undated) DEVICE — WIRE GUIDE 3.2X400MM  357.399

## (undated) DEVICE — SOL NACL 0.9% IRRIG 1000ML BOTTLE 2F7124

## (undated) DEVICE — SUTURE VICRYL+ 2-0 27IN CT-1 UND VCP259H

## (undated) DEVICE — SU DERMABOND ADVANCED .7ML DNX12

## (undated) DEVICE — SU VICRYL+ 3-0 CT1 36IN UND VCP944H

## (undated) DEVICE — DRAPE IOBAN ISOLATION VERTICAL 320X21CM 6617

## (undated) DEVICE — DRILL BIT CANNULATED 16MM HOLLOW STER 03.037.004S

## (undated) DEVICE — ESU PENCIL SMOKE EVAC W/ROCKER SWITCH 0703-047-000

## (undated) DEVICE — KIT PATIENT CARE HANA TABLE PROFX SUPINE 6855

## (undated) DEVICE — GLOVE SURG PI ULTRA TOUCH M SZ 7 LF 42670

## (undated) DEVICE — SOL WATER IRRIG 1000ML BOTTLE 2F7114

## (undated) DEVICE — PREP CHLORAPREP 26ML TINTED HI-LITE ORANGE 930815

## (undated) DEVICE — DRAPE C-ARMOR 5 SIDED 5523

## (undated) DEVICE — ELECTRODE PATIENT RETURN ADULT L10 FT 2 PLATE CORD 0855C

## (undated) DEVICE — SUTURE VICRYL+ 0 27IN CT-1 UND VCP260H

## (undated) DEVICE — MAT FLOOR WATERPROOF BACKSHEET FMBP30

## (undated) DEVICE — DRAPE C-ARM 60X42" 1013

## (undated) DEVICE — SUTURE MONOCRYL 3-0 18 PS2 UND MCP497G

## (undated) DEVICE — GLOVE BIOGEL PI ULTRATOUCH G SZ 7.5 42175

## (undated) DEVICE — GLOVE UNDER INDICATOR PI SZ 7.0 LF 41670

## (undated) RX ORDER — FENTANYL CITRATE-0.9 % NACL/PF 10 MCG/ML
PLASTIC BAG, INJECTION (ML) INTRAVENOUS
Status: DISPENSED
Start: 2024-12-21

## (undated) RX ORDER — PROPOFOL 10 MG/ML
INJECTION, EMULSION INTRAVENOUS
Status: DISPENSED
Start: 2024-12-21

## (undated) RX ORDER — LIDOCAINE HYDROCHLORIDE 10 MG/ML
INJECTION, SOLUTION EPIDURAL; INFILTRATION; INTRACAUDAL; PERINEURAL
Status: DISPENSED
Start: 2024-12-21

## (undated) RX ORDER — DEXAMETHASONE SODIUM PHOSPHATE 10 MG/ML
INJECTION, EMULSION INTRAMUSCULAR; INTRAVENOUS
Status: DISPENSED
Start: 2024-12-21

## (undated) RX ORDER — ONDANSETRON 2 MG/ML
INJECTION INTRAMUSCULAR; INTRAVENOUS
Status: DISPENSED
Start: 2024-12-21

## (undated) RX ORDER — VASOPRESSIN IN 0.9 % NACL 2 UNIT/2ML
SYRINGE (ML) INTRAVENOUS
Status: DISPENSED
Start: 2024-12-21

## (undated) RX ORDER — FENTANYL CITRATE 50 UG/ML
INJECTION, SOLUTION INTRAMUSCULAR; INTRAVENOUS
Status: DISPENSED
Start: 2024-12-21